# Patient Record
Sex: MALE | Race: OTHER | URBAN - METROPOLITAN AREA
[De-identification: names, ages, dates, MRNs, and addresses within clinical notes are randomized per-mention and may not be internally consistent; named-entity substitution may affect disease eponyms.]

---

## 2024-02-28 ENCOUNTER — OFFICE VISIT (OUTPATIENT)
Dept: FAMILY MEDICINE | Facility: HOSPITAL | Age: 44
End: 2024-02-28
Payer: COMMERCIAL

## 2024-02-28 DIAGNOSIS — I10 HYPERTENSION, UNSPECIFIED TYPE: Primary | ICD-10-CM

## 2024-02-28 DIAGNOSIS — K21.9 GASTROESOPHAGEAL REFLUX DISEASE, UNSPECIFIED WHETHER ESOPHAGITIS PRESENT: ICD-10-CM

## 2024-02-28 DIAGNOSIS — Z11.4 SCREENING FOR HIV (HUMAN IMMUNODEFICIENCY VIRUS): ICD-10-CM

## 2024-02-28 DIAGNOSIS — Z11.59 ENCOUNTER FOR HEPATITIS C SCREENING TEST FOR LOW RISK PATIENT: ICD-10-CM

## 2024-02-28 DIAGNOSIS — R10.13 DYSPEPSIA: ICD-10-CM

## 2024-02-28 DIAGNOSIS — R10.9 ABDOMINAL CRAMPING: ICD-10-CM

## 2024-02-28 DIAGNOSIS — E66.9 CLASS 1 OBESITY WITHOUT SERIOUS COMORBIDITY WITH BODY MASS INDEX (BMI) OF 31.0 TO 31.9 IN ADULT, UNSPECIFIED OBESITY TYPE: ICD-10-CM

## 2024-02-28 PROCEDURE — 99203 OFFICE O/P NEW LOW 30 MIN: CPT | Performed by: STUDENT IN AN ORGANIZED HEALTH CARE EDUCATION/TRAINING PROGRAM

## 2024-02-28 RX ORDER — FAMOTIDINE 20 MG/1
20 TABLET, FILM COATED ORAL 2 TIMES DAILY PRN
Qty: 60 TABLET | Refills: 11 | Status: SHIPPED | OUTPATIENT
Start: 2024-02-28 | End: 2024-03-11 | Stop reason: SDUPTHER

## 2024-02-28 RX ORDER — DICYCLOMINE HYDROCHLORIDE 10 MG/1
10 CAPSULE ORAL
Qty: 120 CAPSULE | Refills: 0 | Status: SHIPPED | OUTPATIENT
Start: 2024-02-28 | End: 2024-03-11 | Stop reason: SDUPTHER

## 2024-02-28 RX ORDER — AMLODIPINE BESYLATE 10 MG/1
10 TABLET ORAL DAILY
Qty: 30 TABLET | Refills: 11 | Status: SHIPPED | OUTPATIENT
Start: 2024-02-28 | End: 2024-03-11 | Stop reason: SDUPTHER

## 2024-02-28 NOTE — PROGRESS NOTES
"  History & Physical  [facility]      SUBJECTIVE:     Farzaneh Bautista is a 43 y.o. year old male with no significant PMHx who presents to to establish care:    CC: Establish, HTN, GERD    HTN:  - /94 with repeat 128/100.  - Ran out of his prescribed amlodipine 4 days ago.   - When patient checks his blood pressure every day and reports this systolics are usually within 140s 150s range.  - Of note, patient presented to ED about 4 months ago complaining of chest pain, found to be hypertensive in the systolics of 170s, but otherwise unremarkable workup.  - That was when he started amlodipine.   - He reports inconsistent/p.r.n. use of amlodipine.  - He has not had any further episodes of chest pain.  - Denies smoking, denies drinking.    GERD/Dyspepsia:   - Patient reports a 2 month history of GERD/dyspepsia associated with burping in lower abdominal cramping that is worse with meals.  - Does not take any medications for management.        There are no problems to display for this patient.       (Not in a hospital admission)    Review of patient's allergies indicates:  No Known Allergies     No past medical history on file.   No past surgical history on file.   No family history on file.   Social History     Tobacco Use    Smoking status: Never    Smokeless tobacco: Never   Substance Use Topics    Alcohol use: Not on file      Review of Systems   Constitutional:        As per HPI, otherwise negative        OBJECTIVE:     Vitals:    02/28/24 1333 02/28/24 1400   BP: (!) 143/94 (!) 128/100   Pulse: 90    Weight: 87.9 kg (193 lb 12.6 oz)    Height: 5' 6" (1.676 m)      Estimated body mass index is 31.28 kg/m² as calculated from the following:    Height as of this encounter: 5' 6" (1.676 m).    Weight as of this encounter: 87.9 kg (193 lb 12.6 oz).     Physical Exam  Constitutional:       Appearance: Normal appearance. He is normal weight.   HENT:      Head: Normocephalic and atraumatic.      " Mouth/Throat:      Mouth: Mucous membranes are moist.      Pharynx: Oropharynx is clear.   Eyes:      Extraocular Movements: Extraocular movements intact.      Conjunctiva/sclera: Conjunctivae normal.      Pupils: Pupils are equal, round, and reactive to light.   Cardiovascular:      Rate and Rhythm: Normal rate and regular rhythm.      Pulses: Normal pulses.      Heart sounds: Normal heart sounds.   Pulmonary:      Effort: Pulmonary effort is normal.      Breath sounds: Normal breath sounds.   Abdominal:      General: Abdomen is flat. Bowel sounds are normal.      Palpations: Abdomen is soft.      Tenderness: There is no abdominal tenderness.   Musculoskeletal:      Cervical back: Normal range of motion.      Right lower leg: No edema.      Left lower leg: No edema.   Skin:     General: Skin is warm and dry.      Capillary Refill: Capillary refill takes less than 2 seconds.   Neurological:      Mental Status: He is alert and oriented to person, place, and time. Mental status is at baseline.   Psychiatric:         Mood and Affect: Mood normal.         Labs:    Lab Results   Component Value Date    WBC 5.70 02/28/2024    HGB 15.1 02/28/2024    HCT 41.7 02/28/2024    MCV 84 02/28/2024     02/28/2024           CMP  Sodium   Date Value Ref Range Status   02/28/2024 141 136 - 145 mmol/L Final     Potassium   Date Value Ref Range Status   02/28/2024 3.8 3.5 - 5.1 mmol/L Final     Chloride   Date Value Ref Range Status   02/28/2024 104 95 - 110 mmol/L Final     CO2   Date Value Ref Range Status   02/28/2024 24 23 - 29 mmol/L Final     Glucose   Date Value Ref Range Status   02/28/2024 89 70 - 110 mg/dL Final     BUN   Date Value Ref Range Status   02/28/2024 11 6 - 20 mg/dL Final     Creatinine   Date Value Ref Range Status   02/28/2024 1.2 0.5 - 1.4 mg/dL Final     Calcium   Date Value Ref Range Status   02/28/2024 10.1 8.7 - 10.5 mg/dL Final     Total Protein   Date Value Ref Range Status   02/28/2024 8.0 6.0 -  "8.4 g/dL Final     Albumin   Date Value Ref Range Status   02/28/2024 4.5 3.5 - 5.2 g/dL Final     Total Bilirubin   Date Value Ref Range Status   02/28/2024 0.5 0.1 - 1.0 mg/dL Final     Comment:     For infants and newborns, interpretation of results should be based  on gestational age, weight and in agreement with clinical  observations.    Premature Infant recommended reference ranges:  Up to 24 hours.............<8.0 mg/dL  Up to 48 hours............<12.0 mg/dL  3-5 days..................<15.0 mg/dL  6-29 days.................<15.0 mg/dL       Alkaline Phosphatase   Date Value Ref Range Status   02/28/2024 66 55 - 135 U/L Final     AST   Date Value Ref Range Status   02/28/2024 42 (H) 10 - 40 U/L Final     ALT   Date Value Ref Range Status   02/28/2024 86 (H) 10 - 44 U/L Final     Anion Gap   Date Value Ref Range Status   02/28/2024 13 8 - 16 mmol/L Final     eGFR   Date Value Ref Range Status   02/28/2024 >60 >60 mL/min/1.73 m^2 Final       No results found for: "TSH"    Lab Results   Component Value Date    HGBA1C 5.2 02/28/2024       The ASCVD Risk score (Mumtaz DK, et al., 2019) failed to calculate for the following reasons:    Unable to determine if patient is Non-     Estimated Creatinine Clearance: 83.3 mL/min (based on SCr of 1.2 mg/dL).      ASSESSMENT/PLAN:       Hypertension, unspecified type:  - Encounter orders: amLODIPine (NORVASC) 10 MG tablet; Take 1 tablet (10 mg total) by mouth once daily.  Dispense: 30 tablet; Refill: 11  - Assessment: The patient's blood pressure readings indicate uncontrolled hypertension. This is supported by his self-reported typical systolic readings in the 140s-150s range, as well as a recent ED visit with systolic readings in the 170s. His inconsistent use of amlodipine likely contributes to the uncontrolled state of his hypertension. No further episodes of CP.  - Plan:     - Reinforce the importance of daily consistent use of amlodipine to " control blood pressure.     - Schedule a follow-up visit to monitor blood pressure levels after the patient has been adherent with daily amlodipine use.    Class 1 obesity without serious comorbidity with body mass index (BMI) of 31.0 to 31.9 in adult, unspecified obesity type:  - Encounter orders: Hemoglobin A1C; Future; Expected date: 02/28/2024; CBC Auto Differential; Future; Expected date: 02/28/2024; Comprehensive Metabolic Panel; Future; Expected date: 02/28/2024; Lipid Panel; Future; Expected date: 02/28/2024  - Assessment: The patient's BMI falls within Class 1 obesity range. This may contribute to his hypertension and potentially other comorbid conditions.  - Plan:     - Advise the patient on the importance of a balanced diet and regular physical activity to reduce weight.     - Schedule regular follow-ups to monitor progress and adjust the plan as necessary.     - Plan to administer Glycemic index questionnaire on next encounter.    Gastroesophageal reflux disease, unspecified whether esophagitis present:  - Encounter orders: famotidine (PEPCID) 20 MG tablet; Take 1 tablet (20 mg total) by mouth 2 (two) times daily as needed for Heartburn.  Dispense: 60 tablet; Refill: 11  - Assessment: The patient's reported symptoms of GERD/dyspepsia associated with lower abdominal cramping that worsens with meals suggest a diagnosis of GERD.   - Plan:     - Start the patient on famotidine as needed for heartburn (advised to hold until he submits stool sample).     - Provide dietary advice on avoiding foods and drinks that can trigger GERD symptoms.     Abdominal cramping:  - Encounter orders: dicyclomine (BENTYL) 10 MG capsule; Take 1 capsule (10 mg total) by mouth 4 (four) times daily before meals and nightly.  Dispense: 120 capsule; Refill: 0  - Assessment: The patient's reported abdominal cramping associated with meals is likely related to his GERD and dyspepsia. Dicyclomine has been prescribed to help with these  symptoms.  - Plan:     - Instruct the patient to take dicyclomine before meals and at bedtime.     - Monitor the patient's response to the medication and adjust dosage as necessary.     Dyspepsia:  - Encounter orders: H. pylori antigen, stool; Future; Expected date: 02/28/2024  - Assessment: The patient's symptoms of GERD and abdominal cramping are consistent with dyspepsia. An H. pylori stool antigen test has been ordered to investigate a possible cause.  - Plan:     - Advise the patient to submit stool sample for H. pylori testing.     - Based on the results of this test, appropriate treatment will be initiated.     Screening for HIV (human immunodeficiency virus):   - Encounter orders: HIV 1/2 Ag/Ab (4th Gen); Future; Expected date: 02/28/2024  - Assessment: As part of a routine health examination, an HIV screening test has been ordered.  - Plan:     - F/u results    Encounter for hepatitis C screening test for low risk patient:  - Encounter orders: Hepatitis C Antibody; Future; Expected date: 02/28/2024  - Assessment: As part of a routine health examination, a Hepatitis C screening test has been ordered.  - Plan:     - Educate the patient about Hepatitis C transmission and prevention, F/u results       Patient instructed to receive or provide proof of all deficient vaccines in chart  - patient verbalized understanding      In regards to A&P, patient verbalized understanding and agreeable to plan      Follow up: 1 month BP check, lab results      Case discussed with staff: Dr. Pan      This note was partially created using iCreate Voice Recognition software. Typographical and content errors may occur with this process. While efforts are made to detect and correct such errors, in some cases errors will persist. For this reason, wording in this document should be considered in the proper context and not strictly verbatim.    The following information is provided to all patients.  This information is to help you  find resources for any of the problems found today that may be affecting your health:                Living healthy guide: www.[facility].[place].[facility]       Understanding Diabetes: www.diabetes.org       Eating healthy: www.cdc.gov/healthyweight      CDC home safety checklist: www.cdc.gov/steadi/patient.html      Agency on Aging: www.goea.[place].[facility]       Alcoholics anonymous (AA): www.aa.org      Physical Activity: www.rafael.nih.gov/rx3eoao       Tobacco use: www.quitwithusla.org       Demarco Haney MD  [facility], PGY-3  02/29/2024

## 2024-02-29 VITALS
HEART RATE: 90 BPM | BODY MASS INDEX: 31.15 KG/M2 | DIASTOLIC BLOOD PRESSURE: 100 MMHG | HEIGHT: 66 IN | WEIGHT: 193.81 LBS | SYSTOLIC BLOOD PRESSURE: 128 MMHG

## 2024-02-29 PROBLEM — K21.9 GASTROESOPHAGEAL REFLUX DISEASE: Status: ACTIVE | Noted: 2024-02-29

## 2024-02-29 PROBLEM — I10 HYPERTENSION: Status: ACTIVE | Noted: 2024-02-29

## 2024-02-29 PROBLEM — E66.9 CLASS 1 OBESITY WITHOUT SERIOUS COMORBIDITY WITH BODY MASS INDEX (BMI) OF 31.0 TO 31.9 IN ADULT: Status: ACTIVE | Noted: 2024-02-29

## 2024-02-29 PROBLEM — R10.13 DYSPEPSIA: Status: ACTIVE | Noted: 2024-02-29

## 2024-02-29 PROBLEM — E66.811 CLASS 1 OBESITY WITHOUT SERIOUS COMORBIDITY WITH BODY MASS INDEX (BMI) OF 31.0 TO 31.9 IN ADULT: Status: ACTIVE | Noted: 2024-02-29

## 2024-03-07 ENCOUNTER — LAB VISIT (OUTPATIENT)
Dept: LAB | Facility: HOSPITAL | Age: 44
End: 2024-03-07
Payer: COMMERCIAL

## 2024-03-07 DIAGNOSIS — R10.13 DYSPEPSIA: ICD-10-CM

## 2024-03-07 PROCEDURE — 87338 HPYLORI STOOL AG IA: CPT | Performed by: STUDENT IN AN ORGANIZED HEALTH CARE EDUCATION/TRAINING PROGRAM

## 2024-03-11 DIAGNOSIS — I10 HYPERTENSION, UNSPECIFIED TYPE: ICD-10-CM

## 2024-03-11 DIAGNOSIS — K21.9 GASTROESOPHAGEAL REFLUX DISEASE, UNSPECIFIED WHETHER ESOPHAGITIS PRESENT: ICD-10-CM

## 2024-03-11 DIAGNOSIS — R10.9 ABDOMINAL CRAMPING: ICD-10-CM

## 2024-03-11 RX ORDER — FAMOTIDINE 20 MG/1
20 TABLET, FILM COATED ORAL 2 TIMES DAILY PRN
Qty: 60 TABLET | Refills: 11 | Status: SHIPPED | OUTPATIENT
Start: 2024-03-11 | End: 2024-03-15 | Stop reason: ALTCHOICE

## 2024-03-11 RX ORDER — AMLODIPINE BESYLATE 10 MG/1
10 TABLET ORAL DAILY
Qty: 30 TABLET | Refills: 11 | Status: SHIPPED | OUTPATIENT
Start: 2024-03-11 | End: 2024-03-15 | Stop reason: SDUPTHER

## 2024-03-11 RX ORDER — DICYCLOMINE HYDROCHLORIDE 10 MG/1
10 CAPSULE ORAL
Qty: 120 CAPSULE | Refills: 0 | Status: SHIPPED | OUTPATIENT
Start: 2024-03-11 | End: 2024-03-15 | Stop reason: SDUPTHER

## 2024-03-12 NOTE — PROGRESS NOTES
I assume primary medical responsibility for this patient. I have reviewed the history, physical, and assessement & treatment plan with the resident and agree that the care is reasonable and necessary. This service has been performed by a resident with the presence of a teaching physician under the primary care exception. If necessary, an addendum of additional findings or evaluation beyond the resident documentation will be noted below.    Chronic disease management provided.      Margarita Pan MD    [facility]

## 2024-03-15 ENCOUNTER — TELEPHONE (OUTPATIENT)
Dept: FAMILY MEDICINE | Facility: HOSPITAL | Age: 44
End: 2024-03-15
Payer: COMMERCIAL

## 2024-03-15 DIAGNOSIS — I10 HYPERTENSION, UNSPECIFIED TYPE: ICD-10-CM

## 2024-03-15 DIAGNOSIS — R10.9 ABDOMINAL CRAMPING: ICD-10-CM

## 2024-03-15 DIAGNOSIS — A04.8 H. PYLORI INFECTION: Primary | ICD-10-CM

## 2024-03-15 LAB — H PYLORI AG STL QL IA: DETECTED

## 2024-03-15 RX ORDER — OMEPRAZOLE 20 MG/1
20 CAPSULE, DELAYED RELEASE ORAL 2 TIMES DAILY
Qty: 84 CAPSULE | Refills: 0 | Status: SHIPPED | OUTPATIENT
Start: 2024-03-15 | End: 2024-04-26

## 2024-03-15 RX ORDER — DICYCLOMINE HYDROCHLORIDE 10 MG/1
10 CAPSULE ORAL
Qty: 120 CAPSULE | Refills: 0 | Status: SHIPPED | OUTPATIENT
Start: 2024-03-15 | End: 2024-04-14

## 2024-03-15 RX ORDER — AMLODIPINE BESYLATE 10 MG/1
10 TABLET ORAL DAILY
Qty: 30 TABLET | Refills: 11 | Status: SHIPPED | OUTPATIENT
Start: 2024-03-15 | End: 2025-03-15

## 2024-03-15 RX ORDER — BISMUTH SUBSALICYLATE 525 MG/30ML
15 LIQUID ORAL 4 TIMES DAILY
Qty: 946 ML | Refills: 0 | Status: SHIPPED | OUTPATIENT
Start: 2024-03-15 | End: 2024-03-25

## 2024-03-15 RX ORDER — METRONIDAZOLE 500 MG/1
500 TABLET ORAL 4 TIMES DAILY
Qty: 56 TABLET | Refills: 0 | Status: SHIPPED | OUTPATIENT
Start: 2024-03-15 | End: 2024-03-29

## 2024-03-15 RX ORDER — TETRACYCLINE HYDROCHLORIDE 500 MG/1
500 CAPSULE ORAL 4 TIMES DAILY
Qty: 56 CAPSULE | Refills: 0 | Status: SHIPPED | OUTPATIENT
Start: 2024-03-15 | End: 2024-03-29

## 2024-03-15 NOTE — TELEPHONE ENCOUNTER
Called patient to discuss lab results and medication issues. Patient seen for the first time 3 weeks ago. Patient expressing frustration 2/2 to not being able to fill his prescriptions. It appears that his birth date did not match the pharmacy records. The issues was fixed and now resolved. Will refill patient's medications. However, calling patient to also discuss his pos H pylori test. He continues to endorse similar symptoms. Discussed the treatment and expected disease course. Prescribed quadruple therapy. Advised to take the Tx prescribed. Plan to recheck stool antigen for resolution in 2 weeks time. I answered and addressed rest of patient's questions and concerns.    Demarco Haney MD  [facility], PGY-3  03/15/2024

## 2024-03-18 ENCOUNTER — TELEPHONE (OUTPATIENT)
Dept: FAMILY MEDICINE | Facility: HOSPITAL | Age: 44
End: 2024-03-18
Payer: COMMERCIAL

## 2024-03-18 DIAGNOSIS — L29.9 PRURITUS: Primary | ICD-10-CM

## 2024-03-18 RX ORDER — DIPHENHYDRAMINE HCL 25 MG
25 CAPSULE ORAL EVERY 6 HOURS PRN
Qty: 56 CAPSULE | Refills: 0 | Status: SHIPPED | OUTPATIENT
Start: 2024-03-18 | End: 2024-04-01

## 2024-03-18 NOTE — TELEPHONE ENCOUNTER
Called and discussed patient's itching that started after starting Quadruple therapy for H pylori infection. Reports that he had itching that starting around his  area (including anus) and has also started on the bottom of his feet as well slightly on the top of his feet. Denies any other related symptoms including rash, Respiratory distress, or abdominal issues. This does not sound like a drug reaction but the timing does need to be considered. At this time the benefits>risks of continuing quadruple Tx. Prescribed benadryl to be used prn (take before his meds). I answered and addressed all questions and concerns. Advised regular f/u.     Demarco Haney MD  [facility], PGY-3  03/18/2024

## 2024-03-20 ENCOUNTER — HOSPITAL ENCOUNTER (EMERGENCY)
Facility: HOSPITAL | Age: 44
Discharge: HOME | End: 2024-03-20
Attending: EMERGENCY MEDICINE
Payer: COMMERCIAL

## 2024-03-20 ENCOUNTER — TELEPHONE (OUTPATIENT)
Dept: FAMILY MEDICINE | Facility: HOSPITAL | Age: 44
End: 2024-03-20
Payer: COMMERCIAL

## 2024-03-20 ENCOUNTER — TELEPHONE (OUTPATIENT)
Dept: HEPATOLOGY | Facility: HOSPITAL | Age: 44
End: 2024-03-20
Payer: COMMERCIAL

## 2024-03-20 VITALS
WEIGHT: 190 LBS | BODY MASS INDEX: 30.53 KG/M2 | RESPIRATION RATE: 18 BRPM | OXYGEN SATURATION: 97 % | TEMPERATURE: 99 F | DIASTOLIC BLOOD PRESSURE: 88 MMHG | HEART RATE: 100 BPM | HEIGHT: 66 IN | SYSTOLIC BLOOD PRESSURE: 144 MMHG

## 2024-03-20 DIAGNOSIS — L27.1 FIXED DRUG ERUPTION: Primary | ICD-10-CM

## 2024-03-20 DIAGNOSIS — L13.8: Primary | ICD-10-CM

## 2024-03-20 DIAGNOSIS — T50.905A: Primary | ICD-10-CM

## 2024-03-20 PROBLEM — L13.9 BULLOUS ERUPTION: Status: ACTIVE | Noted: 2024-03-20

## 2024-03-20 LAB
ACANTHOCYTES BLD QL SMEAR: ABNORMAL
ALBUMIN SERPL BCP-MCNC: 4.5 G/DL (ref 3.5–5.2)
ALP SERPL-CCNC: 65 U/L (ref 55–135)
ALT SERPL W/O P-5'-P-CCNC: 54 U/L (ref 10–44)
ANION GAP SERPL CALC-SCNC: 9 MMOL/L (ref 8–16)
ANISOCYTOSIS BLD QL SMEAR: ABNORMAL
ANISOCYTOSIS BLD QL SMEAR: SLIGHT
AST SERPL-CCNC: 37 U/L (ref 10–40)
AUER BODIES BLD QL SMEAR: ABNORMAL
BASO STIPL BLD QL SMEAR: ABNORMAL
BASOPHILS # BLD AUTO: 0.03 K/UL (ref 0–0.2)
BASOPHILS # BLD AUTO: ABNORMAL K/UL (ref 0–0.2)
BASOPHILS NFR BLD: 0.4 % (ref 0–1.9)
BASOPHILS NFR BLD: ABNORMAL % (ref 0–1.9)
BILIRUB SERPL-MCNC: 0.6 MG/DL (ref 0.1–1)
BLASTS NFR BLD MANUAL: ABNORMAL %
BNP SERPL-MCNC: <10 PG/ML (ref 0–99)
BUN SERPL-MCNC: 16 MG/DL (ref 6–20)
BURR CELLS BLD QL SMEAR: ABNORMAL
CABOT RINGS BLD QL SMEAR: ABNORMAL
CALCIUM SERPL-MCNC: 10.5 MG/DL (ref 8.7–10.5)
CHLORIDE SERPL-SCNC: 105 MMOL/L (ref 95–110)
CO2 SERPL-SCNC: 26 MMOL/L (ref 23–29)
CREAT SERPL-MCNC: 1.2 MG/DL (ref 0.5–1.4)
DACRYOCYTES BLD QL SMEAR: ABNORMAL
DIFFERENTIAL METHOD BLD: ABNORMAL
DIFFERENTIAL METHOD BLD: ABNORMAL
DOHLE BOD BLD QL SMEAR: ABNORMAL
EOSINOPHIL # BLD AUTO: 0.4 K/UL (ref 0–0.5)
EOSINOPHIL # BLD AUTO: ABNORMAL K/UL (ref 0–0.5)
EOSINOPHIL NFR BLD: 4.6 % (ref 0–8)
EOSINOPHIL NFR BLD: ABNORMAL % (ref 0–8)
ERYTHROCYTE [DISTWIDTH] IN BLOOD BY AUTOMATED COUNT: 14.3 % (ref 11.5–14.5)
ERYTHROCYTE [DISTWIDTH] IN BLOOD BY AUTOMATED COUNT: ABNORMAL % (ref 11.5–14.5)
EST. GFR  (NO RACE VARIABLE): >60 ML/MIN/1.73 M^2
GIANT PLATELETS BLD QL SMEAR: ABNORMAL
GLUCOSE SERPL-MCNC: 105 MG/DL (ref 70–110)
HCT VFR BLD AUTO: 43.9 % (ref 40–54)
HCT VFR BLD AUTO: ABNORMAL % (ref 40–54)
HEINZ BOD BLD QL SMEAR: ABNORMAL
HGB BLD-MCNC: 15.4 G/DL (ref 14–18)
HGB BLD-MCNC: ABNORMAL G/DL (ref 14–18)
HGB C CRY RBC QL MICRO: ABNORMAL
HOWELL-JOLLY BOD BLD QL SMEAR: ABNORMAL
HYPOCHROMIA BLD QL SMEAR: ABNORMAL
HYPOCHROMIA BLD QL SMEAR: ABNORMAL
IMM GRANULOCYTES # BLD AUTO: 0.07 K/UL (ref 0–0.04)
IMM GRANULOCYTES # BLD AUTO: ABNORMAL K/UL (ref 0–0.04)
IMM GRANULOCYTES NFR BLD AUTO: 0.8 % (ref 0–0.5)
IMM GRANULOCYTES NFR BLD AUTO: ABNORMAL % (ref 0–0.5)
LIPASE SERPL-CCNC: 17 U/L (ref 4–60)
LYMPHOCYTES # BLD AUTO: 0.6 K/UL (ref 1–4.8)
LYMPHOCYTES # BLD AUTO: ABNORMAL K/UL (ref 1–4.8)
LYMPHOCYTES NFR BLD: 7.7 % (ref 18–48)
LYMPHOCYTES NFR BLD: ABNORMAL % (ref 18–48)
MCH RBC QN AUTO: 30.4 PG (ref 27–31)
MCH RBC QN AUTO: ABNORMAL PG (ref 27–31)
MCHC RBC AUTO-ENTMCNC: 35.1 G/DL (ref 32–36)
MCHC RBC AUTO-ENTMCNC: ABNORMAL G/DL (ref 32–36)
MCV RBC AUTO: 87 FL (ref 82–98)
MCV RBC AUTO: ABNORMAL FL (ref 82–98)
MEGAKARYOCYTIC FRAGMENTS: ABNORMAL
METAMYELOCYTES NFR BLD MANUAL: ABNORMAL %
MONOCYTES # BLD AUTO: 0.6 K/UL (ref 0.3–1)
MONOCYTES # BLD AUTO: ABNORMAL K/UL (ref 0.3–1)
MONOCYTES NFR BLD: 6.7 % (ref 4–15)
MONOCYTES NFR BLD: ABNORMAL % (ref 4–15)
MYELOCYTES NFR BLD MANUAL: ABNORMAL %
NEUTROPHILS # BLD AUTO: 6.7 K/UL (ref 1.8–7.7)
NEUTROPHILS # BLD AUTO: ABNORMAL K/UL (ref 1.8–7.7)
NEUTROPHILS NFR BLD: 79.8 % (ref 38–73)
NEUTROPHILS NFR BLD: ABNORMAL % (ref 38–73)
NEUTS BAND NFR BLD MANUAL: ABNORMAL %
NRBC BLD-RTO: 0 /100 WBC
NRBC BLD-RTO: ABNORMAL /100 WBC
OHS QRS DURATION: 86 MS
OHS QTC CALCULATION: 407 MS
OVALOCYTES BLD QL SMEAR: ABNORMAL
OVALOCYTES BLD QL SMEAR: ABNORMAL
PAPPENHEIMER BOD BLD QL SMEAR: ABNORMAL
PLASMODIUM BLD QL SMEAR: ABNORMAL
PLATELET # BLD AUTO: 308 K/UL (ref 150–450)
PLATELET # BLD AUTO: ABNORMAL K/UL (ref 150–450)
PLATELET BLD QL SMEAR: ABNORMAL
PMV BLD AUTO: ABNORMAL FL (ref 9.2–12.9)
PMV BLD AUTO: ABNORMAL FL (ref 9.2–12.9)
POIKILOCYTOSIS BLD QL SMEAR: ABNORMAL
POIKILOCYTOSIS BLD QL SMEAR: SLIGHT
POLYCHROMASIA BLD QL SMEAR: ABNORMAL
POLYCHROMASIA BLD QL SMEAR: ABNORMAL
POTASSIUM SERPL-SCNC: 4.2 MMOL/L (ref 3.5–5.1)
PROMYELOCYTES NFR BLD MANUAL: ABNORMAL %
PROT SERPL-MCNC: 8.4 G/DL (ref 6–8.4)
RBC # BLD AUTO: 5.07 M/UL (ref 4.6–6.2)
RBC # BLD AUTO: ABNORMAL M/UL (ref 4.6–6.2)
RBC AGGLUT BLD QL: ABNORMAL
ROULEAUX BLD QL SMEAR: ABNORMAL
SCHISTOCYTES BLD QL SMEAR: ABNORMAL
SCHISTOCYTES BLD QL SMEAR: ABNORMAL
SICKLE CELLS BLD QL SMEAR: ABNORMAL
SMUDGE CELLS BLD QL SMEAR: ABNORMAL
SODIUM SERPL-SCNC: 140 MMOL/L (ref 136–145)
SPHEROCYTES BLD QL SMEAR: ABNORMAL
SPHEROCYTES BLD QL SMEAR: ABNORMAL
STOMATOCYTES BLD QL SMEAR: ABNORMAL
TARGETS BLD QL SMEAR: ABNORMAL
TOXIC GRANULES BLD QL SMEAR: ABNORMAL
TROPONIN I SERPL DL<=0.01 NG/ML-MCNC: <0.006 NG/ML (ref 0–0.03)
WBC # BLD AUTO: 8.34 K/UL (ref 3.9–12.7)
WBC # BLD AUTO: ABNORMAL K/UL (ref 3.9–12.7)
WBC NRBC COR # BLD: ABNORMAL K/UL (ref 3.9–12.7)
WBC OTHER NFR BLD MANUAL: ABNORMAL %
WBC TOXIC VACUOLES BLD QL SMEAR: ABNORMAL

## 2024-03-20 PROCEDURE — 25000003 PHARM REV CODE 250: Performed by: EMERGENCY MEDICINE

## 2024-03-20 PROCEDURE — 63600175 PHARM REV CODE 636 W HCPCS: Performed by: EMERGENCY MEDICINE

## 2024-03-20 PROCEDURE — 83880 ASSAY OF NATRIURETIC PEPTIDE: CPT | Performed by: EMERGENCY MEDICINE

## 2024-03-20 PROCEDURE — 80053 COMPREHEN METABOLIC PANEL: CPT | Performed by: EMERGENCY MEDICINE

## 2024-03-20 PROCEDURE — 99284 EMERGENCY DEPT VISIT MOD MDM: CPT | Mod: 25

## 2024-03-20 PROCEDURE — 96374 THER/PROPH/DIAG INJ IV PUSH: CPT

## 2024-03-20 PROCEDURE — 83690 ASSAY OF LIPASE: CPT | Performed by: EMERGENCY MEDICINE

## 2024-03-20 PROCEDURE — 84484 ASSAY OF TROPONIN QUANT: CPT | Performed by: EMERGENCY MEDICINE

## 2024-03-20 PROCEDURE — 85025 COMPLETE CBC W/AUTO DIFF WBC: CPT | Performed by: EMERGENCY MEDICINE

## 2024-03-20 RX ORDER — LIDOCAINE HYDROCHLORIDE 20 MG/ML
5 SOLUTION OROPHARYNGEAL
Status: COMPLETED | OUTPATIENT
Start: 2024-03-20 | End: 2024-03-20

## 2024-03-20 RX ORDER — METHYLPREDNISOLONE SOD SUCC 125 MG
125 VIAL (EA) INJECTION
Status: COMPLETED | OUTPATIENT
Start: 2024-03-20 | End: 2024-03-20

## 2024-03-20 RX ORDER — CLOBETASOL PROPIONATE 0.5 MG/G
OINTMENT TOPICAL 2 TIMES DAILY
Qty: 1 EACH | Refills: 2 | Status: SHIPPED | OUTPATIENT
Start: 2024-03-20 | End: 2024-03-25 | Stop reason: SDUPTHER

## 2024-03-20 RX ORDER — OXYCODONE HYDROCHLORIDE 5 MG/1
5 TABLET ORAL EVERY 6 HOURS PRN
Qty: 7 TABLET | Refills: 0 | Status: SHIPPED | OUTPATIENT
Start: 2024-03-20

## 2024-03-20 RX ORDER — OXYCODONE HYDROCHLORIDE 5 MG/1
5 TABLET ORAL
Status: COMPLETED | OUTPATIENT
Start: 2024-03-20 | End: 2024-03-20

## 2024-03-20 RX ORDER — CLOBETASOL PROPIONATE 0.5 MG/G
OINTMENT TOPICAL 2 TIMES DAILY
Qty: 60 G | Refills: 3 | Status: SHIPPED | OUTPATIENT
Start: 2024-03-20 | End: 2024-04-19

## 2024-03-20 RX ORDER — PREDNISONE 20 MG/1
40 TABLET ORAL 2 TIMES DAILY
Qty: 40 TABLET | Refills: 0 | Status: SHIPPED | OUTPATIENT
Start: 2024-03-20 | End: 2024-03-30

## 2024-03-20 RX ADMIN — METHYLPREDNISOLONE SODIUM SUCCINATE 125 MG: 125 INJECTION, POWDER, FOR SOLUTION INTRAMUSCULAR; INTRAVENOUS at 01:03

## 2024-03-20 RX ADMIN — LIDOCAINE HYDROCHLORIDE 5 ML: 20 SOLUTION OROPHARYNGEAL at 12:03

## 2024-03-20 RX ADMIN — OXYCODONE 5 MG: 5 TABLET ORAL at 01:03

## 2024-03-20 NOTE — HPI
Patient is a 43M with a PMHx of HTN,  H. Pylori gastritis who presented to the emergency department for 2 day history of pruritus and pain with associated blistering to the penis and bilateral feet. Patient also experiencing pain with swallowing.     Patient states that on Monday, he began 4 medications for treatment of his H. Pylori (tetracycline, dicyclomine, metronidazole, and omeprazole). Patient states that appx 2 hours after his first dose of these medications, he began to experience pruritus of the penis. Over the next several hours, he states he began to experience swelling of the penis. Patient went to bed and woke up yesterday and was experiencing severe burning sensation of the bilateral feet and pain when swallowing. He called his primary doc who told him to conitnue his medications as prescribed and start taking benadryl. Patient states that yesterday evening, he began to notice blisters on the soles of his feet. Further, patient had been scratching penis vigorously and began to notice skin peeling in that site. He poked 2x blisters on his feet to relieve pressure. When patient woke up this am, patient states he noticed a few more blisters on his feet and increasing pain with swallowing. He was instructed to report to the ED.     Patient denied any ocular itching or pain. Denied fevers. Endorsed generalized fatigue and pain with swallowing. No pain with defecation. Mild tenderness upon urination. Denied any recent infections. Denied history of cold sores. Denied similar rashes in the past. Hypertensive and tachycardic on evaluation. Dermatology consulted by ED over fears of SJS.

## 2024-03-20 NOTE — SUBJECTIVE & OBJECTIVE
History reviewed. No pertinent past medical history.    History reviewed. No pertinent surgical history.  Family History    None       Tobacco Use    Smoking status: Never    Smokeless tobacco: Never   Substance and Sexual Activity    Alcohol use: Not on file    Drug use: Never    Sexual activity: Not on file       Review of patient's allergies indicates:   Allergen Reactions    Dicyclomine      Possible cause of SJS (taken with 3 other medicines)    Metronidazole      Possible cause of SJS (taken with 3 other medicines)    Omeprazole      Possible cause of SJS (taken with 3 other medicines)    Tetracyclines      Possible cause of SJS (taken with 3 other medicines)       Medications:  Continuous Infusions:  Scheduled Meds:  PRN Meds:    Review of Systems   Constitutional:  Positive for fatigue.   HENT:  Positive for sore throat and mouth sores.    Eyes: Negative.    Respiratory: Negative.     Cardiovascular:  Positive for chest pain.   Gastrointestinal: Negative.    Musculoskeletal: Negative.    Skin:  Positive for itching and rash.   Neurological: Negative.    Psychiatric/Behavioral: Negative.     Endocrine: Negative.    Hematologic/Lymphatic: Negative.      Objective:     Vital Signs (Most Recent):  Temp: 98.9 °F (37.2 °C) (03/20/24 1446)  Pulse: 100 (03/20/24 1446)  Resp: 18 (03/20/24 1446)  BP: (!) 144/88 (03/20/24 1446)  SpO2: 97 % (03/20/24 1430) Vital Signs (24h Range):  Temp:  [98 °F (36.7 °C)-98.9 °F (37.2 °C)] 98.9 °F (37.2 °C)  Pulse:  [] 100  Resp:  [18-20] 18  SpO2:  [97 %-99 %] 97 %  BP: (134-192)/() 144/88     Weight: 86.2 kg (190 lb)  Body mass index is 30.67 kg/m².     Physical Exam   Constitutional: He appears well-developed and well-nourished.   Neurological: He is alert and oriented to person, place, and time.   Psychiatric: He has a normal mood and affect.   Skin:   Areas Examined (abnormalities noted in diagram):   Scalp / Hair Palpated and Inspected  Head / Face Inspection  Performed  Neck Inspection Performed  Chest / Axilla Inspection Performed  Abdomen Inspection Performed  Genitals / Buttocks / Groin Inspection Performed  Back Inspection Performed  RUE Inspected  LUE Inspection Performed  RLE Inspected  LLE Inspection Performed  Nails and Digits Inspection Performed  Gland Inspection Performed                  Significant Labs: All pertinent labs within the past 24 hours have been reviewed.    Significant Imaging: I have reviewed all pertinent imaging results/findings within the past 24 hours.

## 2024-03-20 NOTE — ED PROVIDER NOTES
Encounter Date: 3/20/2024       History     Chief Complaint   Patient presents with    Allergic Reaction     Started new medsOmeprezole and Tetracycline and Metronidazole and Dicyclomine started taking at same time. C/o penile itching and feels like having allergic reaction. Skin rash under foot and feels like he can't swallow      HPI  43-year-old male who presents with rash.  He reports that he was started on medications 3 days ago for H pylori.  Looks like omeprazole, she tetracycline, dicyclomine and metronidazole per the triage note.  He reports that about an hour after the 1st dose he noticed some itching in his penis.  After that he had progressive symptoms including itching in his penis, swelling in his penis, burning and itching to his bilateral feet, blisters to the bilateral feet, and a burning sensation in his throat.  He reports that he does feel like his throat is tight and hurts.  He is able to breathe an drink/eat but it hurts.  Mild shortness of breath.  No abdominal pain, nausea or vomiting.  No other rashes.  No history of allergic reactions.  He took about 6 doses of the medications.  Last dose last night, none today.      Review of patient's allergies indicates:   Allergen Reactions    Dicyclomine      Possible cause of SJS (taken with 3 other medicines)    Metronidazole      Possible cause of SJS (taken with 3 other medicines)    Omeprazole      Possible cause of SJS (taken with 3 other medicines)    Tetracyclines      Possible cause of SJS (taken with 3 other medicines)     History reviewed. No pertinent past medical history.  History reviewed. No pertinent surgical history.  History reviewed. No pertinent family history.  Social History     Tobacco Use    Smoking status: Never    Smokeless tobacco: Never   Substance Use Topics    Drug use: Never     Review of Systems    Physical Exam     Initial Vitals [03/20/24 1026]   BP Pulse Resp Temp SpO2   (!) 192/106 (!) 114 20 98 °F (36.7 °C) 99 %       MAP       --         Physical Exam    Nursing note and vitals reviewed.  Constitutional: He appears well-developed and well-nourished. No distress.   HENT:   Head: Normocephalic and atraumatic.   Blistering to the posterior oropharynx.  Otherwise oropharynx patent   Eyes: Conjunctivae and EOM are normal. Pupils are equal, round, and reactive to light.   Neck:   Normal range of motion.  Cardiovascular:  Normal rate, regular rhythm and normal heart sounds.     Exam reveals no gallop and no friction rub.       No murmur heard.  Pulmonary/Chest: Breath sounds normal. No respiratory distress. He has no wheezes. He has no rhonchi. He has no rales.   Abdominal: Abdomen is soft. He exhibits no distension. There is no abdominal tenderness. There is no rebound and no guarding.   Musculoskeletal:         General: Normal range of motion.      Cervical back: Normal range of motion.     Neurological: He is alert and oriented to person, place, and time. He has normal strength.   Skin: Skin is warm and dry.   Edematous penis with blister that has popped and skin sloughing.  There is erythema to bilateral feet, mostly on the plantar surface with large blisters.  No sloughing.  No other rashes.   Psychiatric: He has a normal mood and affect.         ED Course   Procedures  Labs Reviewed   CBC W/ AUTO DIFFERENTIAL - Abnormal; Notable for the following components:       Result Value    nRBC SEE COMMENT (*)     All other components within normal limits    Narrative:     ADD ON EVERETT VALVERDE RN PER ROSA M GARCIA MD ORDER#   [id number] @  03/20/2024  12:39   Add on BNP per MD Laura Garcia  Specimen is clotted, results taken out and SC to Hillary Horn RN   to recollect specimen.   COMPREHENSIVE METABOLIC PANEL - Abnormal; Notable for the following components:    ALT 54 (*)     All other components within normal limits    Narrative:     ADD ON EVERETT VALVERDE RN PER ROSA M GARCIA MD ORDER#   [id number] @  03/20/2024   12:39   Add on BNP per MD Laura Garcia   LIPASE    Narrative:     ADD ON EVERETT VALVERDE RN PER ROSA M GARCIA MD ORDER#   [id number] @  03/20/2024  12:39   Add on BNP per MD Laura Garcia   B-TYPE NATRIURETIC PEPTIDE   TROPONIN I   TROPONIN I    Narrative:     ADD ON EVERETT VALVERDE RN PER ROSA M GARCIA MD ORDER#   [id number] @  03/20/2024  12:39   Add on BNP per MD Laura Garcia   B-TYPE NATRIURETIC PEPTIDE   CBC W/ AUTO DIFFERENTIAL          Imaging Results    None          Medications   LIDOcaine viscous HCl 2% oral solution 5 mL (5 mLs Oral Given 3/20/24 1226)   methylPREDNISolone sodium succinate injection 125 mg (125 mg Intravenous Given 3/20/24 1333)   oxyCODONE immediate release tablet 5 mg (5 mg Oral Given 3/20/24 1331)     Medical Decision Making  43-year-old male who presents with a rash and itching.  Started after he started medications for H pylori.  Here with swelling in skin sloughing from his penis, itching of the penis, burning and itching and blistering of bilateral feet as well as throat burning.  He does have blisters in his posterior oropharynx, a popped blister on his penis, and blisters to the bottom of both feet.  I have a high suspicion this is Galeana-Gilmar.  Probably secondary to tetracycline.  We have already removed the offending source.  He was tachycardic so we will start fluids.  We will check basic labs.  We will need admission but we will consult dermatology to decide if he was stable for admission here versus transfer to a burn center.    Patient reported chest pain to nurse and Dermatology.  EKG without acute process, sinus, rate 100, normal intervals, no ST changes.  We will get troponin, chest x-ray, BNP.  Patient reports the chest pain has been going on for months.  It is actually why he saw the doctor and they diagnosed with H pylori.  Currently not having any chest pain.  It is central, hurts when he breathes.  No diaphoresis.  No hemoptysis.  No leg  swelling.  No history of DVT or PE, long travel, recent surgery.    Discuss with derm.  They think it is more likely a bolus drug eruption/rash.  Not SJS.  Can be discharged home.  They recommend steroid 1 caleb per kg until they follow up with him in clinic on Monday.  Also clobetasol to feet and penis until follow up on Monday.  Otherwise stable for discharge.    Amount and/or Complexity of Data Reviewed  Labs: ordered.  Radiology: ordered.    Risk  Prescription drug management.                                      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

## 2024-03-20 NOTE — CONSULTS
[facility] - Emergency Dept  Dermatology  Consult Note    Patient Name: Farzaneh Bautista  MRN: [id number]  Admission Date: 3/20/2024  Hospital Length of Stay: 0 days  Attending Physician: No att. providers found  Primary Care Provider: Demarco Haney MD     Inpatient consult to Dermatology  Consult performed by: Ravindra Ruano MD  Consult ordered by: Laura Garcia MD  Reason for consult: SJS rule out  Assessment/Recommendations: Patient is a 43M with PMHx of Htn and H pylori gastritis who presents to ED with blistering tender, pruritic eruption of the feet, penis with soft palate erythema and pain> Patient recently started 4x medications for H pylori hours prior to onset of rash that began on penis. No ocular involvement. Anterior oral mucosa uninvolved. Negative [place] sign. Patient does not fit the clinical picture or history for SJS. Patient's presentation and history are most consistent with bullous fixed drug eruption vs EM major, although bullous fixed drug is more likely given classic involvement of feet and penis. Also denied recent URI or hx of cold sores which again favors bullous fixed drug over EM major.     BULLOUS FIXED DRUG ERUPTION     Plan:  - SJS not suspected at this time.  - biopsy unnecessary at this time.    - diagnosis is bullous fixed drug eruption. Culprit drug difficult to identify given 4x meds started at same time.   - from a dermatologic standpoint, does not require admission for this eruption unless deemed necessary by ED.   - rec starting patient on prednisone or prednisolone 1mg/kg/day with follow up in acute care derm clinic on Monday, 3/25/2024, to assess for improvement/resolution.   - rec clobetasol ointment BID to penis and feet.   - consider ENT eval if deemed needed by ED provider.   - patient educated on the nature of this eruption. Patient instructed to return to ED if new lesions form, the current eruption spreads, if he gets lesions on his mouth, if he becomes  febrile, experiences eye pain or trouble seeing, or if he feels increasing generalized fatigue.               Subjective:     Principal Problem:<principal problem not specified>    HPI:  Patient is a 43M with a PMHx of HTN,  H. Pylori gastritis who presented to the emergency department for 2 day history of pruritus and pain with associated blistering to the penis and bilateral feet. Patient also experiencing pain with swallowing.     Patient states that on Monday, he began 4 medications for treatment of his H. Pylori (tetracycline, dicyclomine, metronidazole, and omeprazole). Patient states that appx 2 hours after his first dose of these medications, he began to experience pruritus of the penis. Over the next several hours, he states he began to experience swelling of the penis. Patient went to bed and woke up yesterday and was experiencing severe burning sensation of the bilateral feet and pain when swallowing. He called his primary doc who told him to conitnue his medications as prescribed and start taking benadryl. Patient states that yesterday evening, he began to notice blisters on the soles of his feet. Further, patient had been scratching penis vigorously and began to notice skin peeling in that site. He poked 2x blisters on his feet to relieve pressure. When patient woke up this am, patient states he noticed a few more blisters on his feet and increasing pain with swallowing. He was instructed to report to the ED.     Patient denied any ocular itching or pain. Denied fevers. Endorsed generalized fatigue and pain with swallowing. No pain with defecation. Mild tenderness upon urination. Denied any recent infections. Denied history of cold sores. Denied similar rashes in the past. Hypertensive and tachycardic on evaluation. Dermatology consulted by ED over fears of SJS.     History reviewed. No pertinent past medical history.    History reviewed. No pertinent surgical history.  Family History    None        Tobacco Use    Smoking status: Never    Smokeless tobacco: Never   Substance and Sexual Activity    Alcohol use: Not on file    Drug use: Never    Sexual activity: Not on file       Review of patient's allergies indicates:   Allergen Reactions    Dicyclomine      Possible cause of SJS (taken with 3 other medicines)    Metronidazole      Possible cause of SJS (taken with 3 other medicines)    Omeprazole      Possible cause of SJS (taken with 3 other medicines)    Tetracyclines      Possible cause of SJS (taken with 3 other medicines)       Medications:  Continuous Infusions:  Scheduled Meds:  PRN Meds:    Review of Systems   Constitutional:  Positive for fatigue.   HENT:  Positive for sore throat and mouth sores.    Eyes: Negative.    Respiratory: Negative.     Cardiovascular:  Positive for chest pain.   Gastrointestinal: Negative.    Musculoskeletal: Negative.    Skin:  Positive for itching and rash.   Neurological: Negative.    Psychiatric/Behavioral: Negative.     Endocrine: Negative.    Hematologic/Lymphatic: Negative.      Objective:     Vital Signs (Most Recent):  Temp: 98.9 °F (37.2 °C) (03/20/24 1446)  Pulse: 100 (03/20/24 1446)  Resp: 18 (03/20/24 1446)  BP: (!) 144/88 (03/20/24 1446)  SpO2: 97 % (03/20/24 1430) Vital Signs (24h Range):  Temp:  [98 °F (36.7 °C)-98.9 °F (37.2 °C)] 98.9 °F (37.2 °C)  Pulse:  [] 100  Resp:  [18-20] 18  SpO2:  [97 %-99 %] 97 %  BP: (134-192)/() 144/88     Weight: 86.2 kg (190 lb)  Body mass index is 30.67 kg/m².     Physical Exam   Constitutional: He appears well-developed and well-nourished.   Neurological: He is alert and oriented to person, place, and time.   Psychiatric: He has a normal mood and affect.   Skin:   Areas Examined (abnormalities noted in diagram):   Scalp / Hair Palpated and Inspected  Head / Face Inspection Performed  Neck Inspection Performed  Chest / Axilla Inspection Performed  Abdomen Inspection Performed  Genitals / Buttocks / Groin  Inspection Performed  Back Inspection Performed  RUE Inspected  LUE Inspection Performed  RLE Inspected  LLE Inspection Performed  Nails and Digits Inspection Performed  Gland Inspection Performed                  Significant Labs: All pertinent labs within the past 24 hours have been reviewed.    Significant Imaging: I have reviewed all pertinent imaging results/findings within the past 24 hours.      Assessment/Plan:     No new Assessment & Plan notes have been filed under this hospital service since the last note was generated.  Service: Dermatology    Patient is a 43M with PMHx of Htn and H pylori gastritis who presents to ED with blistering tender, pruritic eruption of the feet, penis with soft palate erythema and pain> Patient recently started 4x medications for H pylori hours prior to onset of rash that began on penis. No ocular involvement. Anterior oral mucosa uninvolved. Negative [place] sign. Patient does not fit the clinical picture or history for SJS. Patient's presentation and history are most consistent with bullous fixed drug eruption vs EM major, although bullous fixed drug is more likely given classic involvement of feet and penis. Also denied recent URI or hx of cold sores which again favors bullous fixed drug over EM major.     BULLOUS FIXED DRUG ERUPTION     Plan:  - SJS not suspected at this time.   - biopsy unnecessary at this time,  - diagnosis is bullous fixed drug eruption. Culprit drug difficult to identify given 4x meds started at same time.   - from a dermatologic standpoint, does not require admission for this eruption unless deemed necessary by ED.   - rec starting patient on prednisone or prednisolone 1mg/kg/day with follow up in acute care derm clinic on Monday, 3/25/2024, to assess for improvement/resolution.   - rec clobetasol ointment BID to penis and feet.   - consider ENT eval if deemed needed by ED provider. Rec viscous lidocaine for throat pain.   - patient educated on the  nature of this eruption. Patient instructed to return to ED if new lesions form, the current eruption spreads, if he gets lesions on his mouth, if he becomes febrile, experiences eye pain or trouble seeing, or if he feels increasing generalized fatigue.       Thank you for your consult. I will sign off. Please contact us if you have any additional questions.    Ravindra Ruano MD  Dermatology  [facility]

## 2024-03-20 NOTE — TELEPHONE ENCOUNTER
Called patient regarding potential medication allergic reaction. It appears that symptoms have worsened and he now has notable rash and/or skin sloughing. He reports that he is actually at the ED getting evaluated currently. He has since discontinued offending medications (being treated for H pylori w/ quadruple therapy). I had prescribed benadryl to be used with his meds initially 2/2 to some pruritus without any skin findings. Unfortunately, he now has an associated rash/ skin sloughing and reported some difficulty with swallowing. I agreed with ED evaluation which he is currently doing. I advised him to discontinue all associated medications at this time. I advised him to make a f/u apt with me at his earliest convenience. I answered and addressed all questions and concerns.    Demarco Haney MD  [facility], PGY-3  03/20/2024

## 2024-03-20 NOTE — DISCHARGE INSTRUCTIONS
STOP TAKING ALL FOUR MEDICATIONS PRESCRIBED TO YOU 3 DAYS AGO FOR H PYLORI. We cannot tell which is the cause of your allergic reaction so you should assume you are allergic to all of them unless told otherwise.    For mild pain:  Ibuprofen 400mg every 6hrs as needed  Tylenol 650mg every 6hrs as needed    For severe pain:  Oxycodone 5mg every 4-6hrs as needed    For just mouth pain:  Magic mouthwash - Swish and spit (don't swallow) 5mL every 4-6hrs as needed    Take:  Prednisone 40mg two times daily until told to taper down by dermatology    Clobetasol cream: apply a thin layer to feet and penis (not mouth or face) two times daily for 2 weeks or until told to stop by dermatology    Call the number listed for dermatology for an appointment Friday or Monday.    Come back to the emergency department if you have any significant worsening of your rash, especially your mouth.

## 2024-03-20 NOTE — ED TRIAGE NOTES
"Farzaneh Bautista, an 43 y.o. male presents to the ED via POV with itchy rash on soles of both feet, penis, and throat x2 days. Reports starting new medications 2 days ago. Endorses mid "burning" CP. Denies SOB, NVD, recent illness, chills, fever.       Chief Complaint   Patient presents with    Allergic Reaction     Started new medsOmeprezole and Tetracycline and Metronidazole and Dicyclomine started taking at same time. C/o penile itching and feels like having allergic reaction. Skin rash under foot and feels like he can't swallow      Review of patient's allergies indicates:  No Known Allergies  No past medical history on file.    "

## 2024-03-20 NOTE — ED NOTES
Nurses Note -- 4 Eyes      3/20/2024   12:16 PM      Skin assessed during: Admit      [] No Altered Skin Integrity Present    []Prevention Measures Documented      [x] Yes- Altered Skin Integrity Present or Discovered   [] LDA Added if Not in Epic (Describe Wound)   [x] New Altered Skin Integrity was Present on Admit and Documented in LDA   [x] Wound Image Taken    Wound Care Consulted? Yes    Attending Nurse:  Hillary Dodge RN/Staff Member:   Nissa

## 2024-03-25 ENCOUNTER — OFFICE VISIT (OUTPATIENT)
Dept: DERMATOLOGY | Facility: CLINIC | Age: 44
End: 2024-03-25
Payer: COMMERCIAL

## 2024-03-25 DIAGNOSIS — L13.8: ICD-10-CM

## 2024-03-25 DIAGNOSIS — T50.905A: ICD-10-CM

## 2024-03-25 DIAGNOSIS — L27.1 FIXED DRUG ERUPTION: Primary | ICD-10-CM

## 2024-03-25 PROCEDURE — 99203 OFFICE O/P NEW LOW 30 MIN: CPT | Mod: S$GLB,,, | Performed by: DERMATOLOGY

## 2024-03-25 PROCEDURE — 99999 PR PBB SHADOW E&M-EST. PATIENT-LVL II: CPT | Mod: PBBFAC,,, | Performed by: DERMATOLOGY

## 2024-03-25 RX ORDER — CLOBETASOL PROPIONATE 0.5 MG/G
OINTMENT TOPICAL 2 TIMES DAILY
Qty: 60 G | Refills: 2 | Status: SHIPPED | OUTPATIENT
Start: 2024-03-25 | End: 2024-04-24

## 2024-03-25 RX ORDER — PREDNISONE 10 MG/1
TABLET ORAL
Qty: 84 TABLET | Refills: 0 | Status: SHIPPED | OUTPATIENT
Start: 2024-03-29 | End: 2024-04-19

## 2024-03-25 NOTE — PROGRESS NOTES
Subjective:      Patient ID:  Farzaneh Bautista is a 43 y.o. male who presents for No chief complaint on file.    Patient is a 43M with PMHx of Htn and H pylori gastritis who presented to ED with blistering tender, pruritic eruption of the feet, penis with soft palate erythema and pain. Patient recently started 4x medications for H pylori hours prior to onset of rash that began on penis. No ocular involvement. Anterior oral mucosa uninvolved. Negative [place] sign. At the time, bullous fixed drug was suspected and patient was started on prednisone 40 BID and topical clobetasol. Was also given lidocaine swish and spit.     Patient states that since since in ED by us, he has minimal improvement. Patient denies any new blisters or areas of involvement, however, continues to experience pain in his mouth, around his penis, and on his feet (L > R). Patient states the blisters have ruptured and he's now left with very red, tender skin from underneath. Reports pain when manipulating the penis when using the bathroom. Pain when swallowing although that has improved. Also reports pain when walking, prompting him to use crutches for this appt.       Review of Systems   Constitutional: Negative.    HENT:  Positive for sore throat and mouth sores.    Eyes: Negative.    Respiratory: Negative.     Cardiovascular: Negative.    Gastrointestinal: Negative.    Genitourinary:  Positive for genital sores.   Skin:  Positive for rash.   Psychiatric/Behavioral: Negative.         Objective:   Physical Exam   Constitutional: He appears well-developed and well-nourished.   Neurological: He is alert and oriented to person, place, and time.   Psychiatric: He has a normal mood and affect.   Skin:                    Diagram Legend     Erythematous scaling macule/papule c/w actinic keratosis       Vascular papule c/w angioma      Pigmented verrucoid papule/plaque c/w seborrheic keratosis      Yellow umbilicated papule c/w sebaceous  hyperplasia      Irregularly shaped tan macule c/w lentigo     1-2 mm smooth white papules consistent with [place]      Movable subcutaneous cyst with punctum c/w epidermal inclusion cyst      Subcutaneous movable cyst c/w pilar cyst      Firm pink to brown papule c/w dermatofibroma      Pedunculated fleshy papule(s) c/w skin tag(s)      Evenly pigmented macule c/w junctional nevus     Mildly variegated pigmented, slightly irregular-bordered macule c/w mildly atypical nevus      Flesh colored to evenly pigmented papule c/w intradermal nevus       Pink pearly papule/plaque c/w basal cell carcinoma      Erythematous hyperkeratotic cursted plaque c/w SCC      Surgical scar with no sign of skin cancer recurrence      Open and closed comedones      Inflammatory papules and pustules      Verrucoid papule consistent consistent with wart     Erythematous eczematous patches and plaques     Dystrophic onycholytic nail with subungual debris c/w onychomycosis     Umbilicated papule    Erythematous-base heme-crusted tan verrucoid plaque consistent with inflamed seborrheic keratosis     Erythematous Silvery Scaling Plaque c/w Psoriasis     See annotation      Assessment / Plan:        Fixed drug eruption  BULLOUS FIXED DRUG ERUPTION      Plan:  - pt is a hospital f/u for bullous fixed drug eruption involving posterior oropharynx, penis, and bilateral feet.   - diagnosis is bullous fixed drug eruption. Culprit drug difficult to identify given 4x meds started at same time.   - improving and reepithelializing throughout.   - pt has been on prednisone 40mg BID since Thursday, today is day 4. Will do a 28 day taper. Will continue prednisone 40 BID with last dose Thursday evening. Friday morning, patient to start 30mg BID for 7 days, followed by 20 mg BID for 7 days, followed by 10mg BID for 7 days.   - cont. clobetasol ointment BID to feet.  - start plain vaseline or aquaphor BID to penis and feet.   - refilled viscous lidocaine for  throat pain.   - patient educated on the nature of this eruption. Patient instructed to return to ED if new lesions form, the current eruption spreads, if he gets lesions on his mouth, if he becomes febrile, experiences eye pain or trouble seeing, or if he feels increasing generalized fatigue.   - RTC 4 weeks            No follow-ups on file.

## 2024-04-01 ENCOUNTER — OFFICE VISIT (OUTPATIENT)
Dept: FAMILY MEDICINE | Facility: HOSPITAL | Age: 44
End: 2024-04-01
Payer: COMMERCIAL

## 2024-04-01 DIAGNOSIS — L13.9 BULLOUS ERUPTION: ICD-10-CM

## 2024-04-01 DIAGNOSIS — I10 PRIMARY HYPERTENSION: ICD-10-CM

## 2024-04-01 DIAGNOSIS — E66.9 CLASS 1 OBESITY WITHOUT SERIOUS COMORBIDITY WITH BODY MASS INDEX (BMI) OF 30.0 TO 30.9 IN ADULT, UNSPECIFIED OBESITY TYPE: ICD-10-CM

## 2024-04-01 DIAGNOSIS — A04.8 H. PYLORI INFECTION: Primary | ICD-10-CM

## 2024-04-01 DIAGNOSIS — L27.1 FIXED DRUG ERUPTION: ICD-10-CM

## 2024-04-01 DIAGNOSIS — E78.2 MIXED HYPERLIPIDEMIA: ICD-10-CM

## 2024-04-01 PROCEDURE — 99214 OFFICE O/P EST MOD 30 MIN: CPT | Performed by: STUDENT IN AN ORGANIZED HEALTH CARE EDUCATION/TRAINING PROGRAM

## 2024-04-01 RX ORDER — BISMUTH SUBSALICYLATE 525 MG/30ML
15 LIQUID ORAL EVERY 4 HOURS
Qty: 946 ML | Refills: 0 | Status: SHIPPED | OUTPATIENT
Start: 2024-04-01 | End: 2024-04-15

## 2024-04-02 ENCOUNTER — TELEPHONE (OUTPATIENT)
Dept: HEPATOLOGY | Facility: HOSPITAL | Age: 44
End: 2024-04-02
Payer: COMMERCIAL

## 2024-04-02 VITALS
OXYGEN SATURATION: 98 % | WEIGHT: 186.31 LBS | DIASTOLIC BLOOD PRESSURE: 91 MMHG | SYSTOLIC BLOOD PRESSURE: 134 MMHG | BODY MASS INDEX: 29.94 KG/M2 | HEIGHT: 66 IN

## 2024-04-02 DIAGNOSIS — A04.8 H. PYLORI INFECTION: Primary | ICD-10-CM

## 2024-04-02 RX ORDER — AMOXICILLIN 500 MG/1
1000 TABLET, FILM COATED ORAL EVERY 12 HOURS
Qty: 56 TABLET | Refills: 0 | Status: SHIPPED | OUTPATIENT
Start: 2024-04-02 | End: 2024-04-16

## 2024-04-02 NOTE — PROGRESS NOTES
I assume primary medical responsibility for this patient. I have reviewed the history, physical, and assessment & treatment plan with the resident and agree that the care is reasonable and necessary. This service has been performed by a resident without the presence of a teaching physician under the primary care exception. If necessary, an addendum of additional findings or evaluation beyond the resident documentation will be noted below.        James Andrew Jr., DO    [facility]

## 2024-04-02 NOTE — PROGRESS NOTES
Progress Note  [facility]    Subjective:     Farzaneh Bautisat is a 43 y.o. year old male with PMHx of GERD who presents to clinic for:    CC: H pylori, Drug reaction    H. Pylori/Drug reaction:  - Patient started on standard quadruple therapy 2-3 weeks ago.  - He was unable to complete the course (only completed 1 1/2 days).  - This was 2/2 to a drug reaction from one of the 4 meds that was started.  - It started with pruritis of his groin and the bottom of his feet that did not improve with benadryl, but instead worsed into a bullous fixed drug eruption.  - S/p ED evaluation and established with derm for bullous fixed drug eruption that ended up involved posterior oropharynx, penis, and bilateral feet.   - Started on a prednisone 40mg BID w/ 28 day taper, as well as indicated creams and ointments to affected areas.   - Regarding H pylori, patient reports continued GERD/dyspepsia associated with burping in lower abdominal cramping that is worse with meals.  - At this time is not on any medications due to fear of allergic reaction.  - Of note, allergic symptoms started within an hour of starting medications and became more severe as the time progressed and continued to take the meds.    HTN:  - /97 w/ repeat at 134/91.  - Has not been taking prescribed amlodipine 2/2 to potential allergic reaction.  - Patient has classically been on this medications with no issues.      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    Bullous eruption 03/20/2024    Hypertension 02/29/2024    Class 1 obesity without serious comorbidity with body mass index (BMI) of 31.0 to 31.9 in adult 02/29/2024    Gastroesophageal reflux disease 02/29/2024    Dyspepsia 02/29/2024        (Not in a hospital admission)    Review of patient's allergies indicates:   Allergen Reactions    Dicyclomine      Possible cause of SJS (taken with 3 other medicines)    Metronidazole      Possible cause  "of SJS (taken with 3 other medicines)    Omeprazole      Possible cause of SJS (taken with 3 other medicines)    Tetracyclines      Possible cause of SJS (taken with 3 other medicines)        No past medical history on file.   No past surgical history on file.   No family history on file.   Social History     Tobacco Use    Smoking status: Never    Smokeless tobacco: Never   Substance Use Topics    Alcohol use: Not on file      Review of Systems   Constitutional:        As per HPI, otherwise negative        Objective:     Vitals:    04/01/24 1513 04/01/24 1530   BP: (!) 137/97 (!) 134/91   SpO2: 98%    Weight: 84.5 kg (186 lb 4.6 oz)    Height: 5' 6" (1.676 m)      Estimated body mass index is 30.07 kg/m² as calculated from the following:    Height as of this encounter: 5' 6" (1.676 m).    Weight as of this encounter: 84.5 kg (186 lb 4.6 oz).     Physical Exam  Constitutional:       Appearance: Normal appearance. He is normal weight.   HENT:      Head: Normocephalic and atraumatic.      Mouth/Throat:      Mouth: Mucous membranes are moist.      Pharynx: Oropharynx is clear. Posterior oropharyngeal erythema present.   Eyes:      Extraocular Movements: Extraocular movements intact.      Conjunctiva/sclera: Conjunctivae normal.      Pupils: Pupils are equal, round, and reactive to light.   Cardiovascular:      Rate and Rhythm: Normal rate and regular rhythm.      Pulses: Normal pulses.      Heart sounds: Normal heart sounds.   Pulmonary:      Effort: Pulmonary effort is normal.      Breath sounds: Normal breath sounds.   Abdominal:      General: Abdomen is flat. Bowel sounds are normal.      Palpations: Abdomen is soft.      Tenderness: There is no abdominal tenderness.   Genitourinary:     Comments: Exam deferred  Musculoskeletal:      Cervical back: Normal range of motion.      Right lower leg: No edema.      Left lower leg: No edema.      Comments: Well demarcated patches of erythema to B/L soles of feet.   Skin:    "  General: Skin is warm and dry.      Capillary Refill: Capillary refill takes less than 2 seconds.   Neurological:      Mental Status: He is alert and oriented to person, place, and time. Mental status is at baseline.   Psychiatric:         Mood and Affect: Mood normal.       Assessment/Plan:       H. pylori infection:  - Encounter Orders: bismuth subsalicylate (PEPTO BISMOL) 262 mg/15 mL suspension; Take 15 mLs by mouth every 4 (four) hours. for 14 days  Dispense: 946 mL; Refill: 0  - Assessment: The patient's persistent GERD/dyspepsia symptoms, associated with burping and lower abdominal cramping, particularly after meals, suggest a continued H. pylori infection. This is likely due to the incomplete course of quadruple therapy, which was interrupted due to a severe allergic reaction to one of the agents.   - Plan:    - Ok to continue the use of PPI and Bentyl, as previously tolerated, and add Pepto Bismol to potentially aid in symptom management and as part of the indicated quadruple therapy.    - Plan to transition the patient to an alternative regimen for H. pylori treatment, starting with Augmentin, followed by the addition of Clarithromycin, if tolerated.    - Monitor patient closely during the course of treatment and instruct to discontinue any offending agent at the immediate onset of symptoms.     Fixed drug eruption:  - Assessment: The patient experienced a drug reaction to one of the medications in the quadruple therapy, manifesting as a bullous fixed drug eruption. This reaction involved pruritus, and progressed to involve the posterior oropharynx, penis, and bilateral feet.  - Plan:     - Continue management as recommended by dermatology, including prednisone 40mg BID with a 28 day taper, and application of indicated creams and ointments to affected areas.    - Regular follow-up to monitor healing and prevent potential complications.    Bullous eruption:  - Assessment: The bullous eruption, which is  part of the fixed drug eruption, has shown improvement under current management, indicating effective treatment.  - Plan:     - Continue the current management plan, including the use of prednisone and topical treatments.    - Monitor the patient's skin condition closely to ensure continued improvement and prevent complications.    Class 1 obesity without serious comorbidity with body mass index (BMI) of 30.0 to 30.9 in adult, unspecified obesity type:  - Assessment: The patient's BMI falls within the range for class 1 obesity. Weight loss since previous encounter.  - Plan:     - Encourage a healthy diet and regular exercise to help manage weight.    - Regular follow-ups to monitor weight and related health parameters.    Primary hypertension:  - Assessment: The patient's blood pressure readings were elevated, indicating mildly uncontrolled hypertension. Non-compliance with antihypertensive medication due to fear of allergic reaction may have contributed to this.  - Plan:     - Advise the patient to resume taking prescribed amlodipine, reassuring him that he has previously tolerated this medication.    - Regular monitoring of blood pressure levels and adjustment of medication as necessary.    - Encourage lifestyle modifications including a healthy diet, regular exercise, and stress management.     Mixed Hyperlipidemia:  - Assessment: The patient's LDL level is greater than 170, indicating mixed hyperlipidemia. Currently, the patient is not on statin therapy.   - Plan:     - At this time, it would not be appropriate to initiate statin therapy given the plan to carefully transition to an alternative H. pylori treatment regimen and the need to closely monitor for tolerance.     - Once the patient has completed the H. pylori treatment regimen and is stable, reconsider the initiation of statin therapy.     - Encourage lifestyle modifications such as a heart-healthy diet, regular exercise, and weight management.     -  Regular lipid profile monitoring to track progress and guide future management.      Patient instructed to receive or provide proof of all deficient vaccines in chart - patient verbalized understanding      In regards to A&P, patient verbalized understanding and agreeable to plan      Follow-up:2 weeks, re-evaluation      Case discussed with staff: Dr. Andrew      This note was partially created using [employer] Voice Recognition software. Typographical and content errors may occur with this process. While efforts are made to detect and correct such errors, in some cases errors will persist. For this reason, wording in this document should be considered in the proper context and not strictly verbatim.    The following information is provided to all patients.  This information is to help you find resources for any of the problems found today that may be affecting your health:                Living healthy guide: www.[facility].[place].gov       Understanding Diabetes: www.diabetes.org       Eating healthy: www.cdc.gov/healthyweight      [facility] home safety checklist: www.cdc.gov/steadi/patient.html      Agency on Aging: www.goea.[place].gov       Alcoholics anonymous (AA): www.aa.org      Physical Activity: www.rafael.nih.gov/hs7bcjw       Tobacco use: www.quitwithusla.org        Demarco Haney MD  [facility], PGY-3  04/02/2024

## 2024-04-03 ENCOUNTER — TELEPHONE (OUTPATIENT)
Dept: FAMILY MEDICINE | Facility: HOSPITAL | Age: 44
End: 2024-04-03
Payer: COMMERCIAL

## 2024-04-03 DIAGNOSIS — A04.8 H. PYLORI INFECTION: Primary | ICD-10-CM

## 2024-04-03 RX ORDER — CLARITHROMYCIN 500 MG/1
500 TABLET, FILM COATED ORAL EVERY 12 HOURS
Qty: 28 TABLET | Refills: 0 | Status: SHIPPED | OUTPATIENT
Start: 2024-04-03 | End: 2024-04-17

## 2024-04-03 NOTE — TELEPHONE ENCOUNTER
Called to see how new alternative quadruple Therapy is going. Patient tolerating meds well. Taking PPI and Bismuth as prescribed. Also taking Bentyl prn for abdominal pain/bloating, tolerating well. Will begin Augmentin today and observe his tolerance. Advised him to discontinue at any signs of possible allergic reaction. Discussed ED precautions. Plan to start Clarithromycin tomorrow and to continue the alternative quadruple Tx for 14 days.    Demarco Haney MD  [facility], PGY-3  04/03/2024

## 2024-04-04 NOTE — TELEPHONE ENCOUNTER
Called Patient to discuss treatment course of alternative Quadruple Tx for H pylori. Given allergic reaction, he was started on alternative regimen. Addition of Amoxacillin yesterday, tolerating well. Added Clarithromycin today. Will monitor. Discussed indications to hold medication. Discussed ED precautions. Patient agreeable to treatment plan.    Demarco Haney MD  [facility], PGY-3  04/04/2024

## 2024-04-09 NOTE — PROGRESS NOTES
I have seen the patient, reviewed the Resident's progress note. I have personally interviewed and examined the patient at bedside and agree with the findings.     Magy Castellon MD  [facility]

## 2024-04-18 ENCOUNTER — OFFICE VISIT (OUTPATIENT)
Dept: FAMILY MEDICINE | Facility: HOSPITAL | Age: 44
End: 2024-04-18
Payer: COMMERCIAL

## 2024-04-18 VITALS
WEIGHT: 190.5 LBS | HEART RATE: 107 BPM | SYSTOLIC BLOOD PRESSURE: 141 MMHG | DIASTOLIC BLOOD PRESSURE: 95 MMHG | HEIGHT: 66 IN | OXYGEN SATURATION: 97 % | BODY MASS INDEX: 30.62 KG/M2

## 2024-04-18 DIAGNOSIS — A04.8 H. PYLORI INFECTION: Primary | ICD-10-CM

## 2024-04-18 DIAGNOSIS — L13.9 BULLOUS ERUPTION: ICD-10-CM

## 2024-04-18 DIAGNOSIS — E66.9 CLASS 1 OBESITY WITHOUT SERIOUS COMORBIDITY WITH BODY MASS INDEX (BMI) OF 30.0 TO 30.9 IN ADULT, UNSPECIFIED OBESITY TYPE: ICD-10-CM

## 2024-04-18 DIAGNOSIS — I10 PRIMARY HYPERTENSION: ICD-10-CM

## 2024-04-18 DIAGNOSIS — E78.2 MIXED HYPERLIPIDEMIA: ICD-10-CM

## 2024-04-18 PROCEDURE — 99214 OFFICE O/P EST MOD 30 MIN: CPT | Performed by: STUDENT IN AN ORGANIZED HEALTH CARE EDUCATION/TRAINING PROGRAM

## 2024-04-18 RX ORDER — ATORVASTATIN CALCIUM 20 MG/1
20 TABLET, FILM COATED ORAL DAILY
Qty: 30 TABLET | Refills: 11 | Status: SHIPPED | OUTPATIENT
Start: 2024-04-18 | End: 2025-04-18

## 2024-04-18 NOTE — PROGRESS NOTES
Progress Note  [facility]    Subjective:     Farzaneh Bautista is a 43 y.o. year old male with significant PMHx of Drug induced Bullous eruption who presents to clinic for:    CC: H pylori, Drug reaction    H. Pylori/Drug reaction:  - Patient has finished alternative treatment course of quadruple Tx.  - He was initially unable to complete the course (only completed 1 1/2 days).  - This was 2/2 to a drug reaction from one of the 2 meds (Tetracycline or Metronidazole), which was part of the initial quadruple treatment protocol.  - Skin symptoms initially started with pruritis of his groin and the bottom of his feet that did not improve with benadryl, but instead worsed into a bullous fixed drug eruption.  - S/p ED evaluation and established with derm for bullous fixed drug eruption that ended up involved posterior oropharynx, penis, and bilateral feet.   - S/p prednisone 40mg BID w/ 28 day taper, also treatment w/ indicated creams and ointments to affected areas.  - Has had regular f/u with Derm.   - Regarding H pylori, symptoms have resolved following treatment of alternative quadruple Tx.  - Of note, allergic symptoms started within an hour of starting medications and became more severe as the time progressed and continued to take the meds.    HTN:  - /112 w/ repeat 141/95  - Has not been taking prescribed amlodipine 2/2 to potential allergic reaction.  - Patient has classically been on this medications with no issues.  - Amenable to begin antihypertensive management now that quadruple Tx is finished.    HLD:  - Noted to have moderately elevated Trigs and LDL on recent lipid panel,  - Amenable to begin statin therapy.      Patient Active Problem List    Diagnosis Date Noted    H. 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   No past surgical history on file.   No family history on file.   Social History     Tobacco Use    Smoking status: Never    Smokeless tobacco: Never   Substance Use Topics    Alcohol use: Not on file      Review of Systems   Constitutional:        As per HPI, otherwise negative        Objective:     Vitals:    04/18/24 0958 04/18/24 1002   BP: (!) 153/112 (!) 141/95   Pulse: 107    SpO2: 97%    Weight: 86.4 kg (190 lb 7.6 oz)    Height: 5' 6" (1.676 m)      Estimated body mass index is 30.74 kg/m² as calculated from the following:    Height as of this encounter: 5' 6" (1.676 m).    Weight as of this encounter: 86.4 kg (190 lb 7.6 oz).     Physical Exam  Constitutional:       Appearance: Normal appearance. He is normal weight.   HENT:      Head: Normocephalic and atraumatic.      Mouth/Throat:      Mouth: Mucous membranes are moist.      Pharynx: Oropharynx is clear.   Eyes:      Extraocular Movements: Extraocular movements intact.      Conjunctiva/sclera: Conjunctivae normal.      Pupils: Pupils are equal, round, and reactive to light.   Cardiovascular:      Rate and Rhythm: Normal rate and regular rhythm.      Pulses: Normal pulses.      Heart sounds: Normal heart sounds.   Pulmonary:      Effort: Pulmonary effort is normal.      Breath sounds: Normal breath sounds.   Abdominal:      General: Abdomen is flat. Bowel sounds are normal.      Palpations: Abdomen is soft.      Tenderness: There is no abdominal tenderness.   Musculoskeletal:      Cervical back: Normal range of motion.      Right lower leg: No edema.      Left lower leg: No " edema.   Skin:     General: Skin is warm and dry.      Capillary Refill: Capillary refill takes less than 2 seconds.   Neurological:      Mental Status: He is alert and oriented to person, place, and time. Mental status is at baseline.   Psychiatric:         Mood and Affect: Mood normal.       Assessment/Plan:     Farzaneh was seen today for follow-up.    Diagnoses and all orders for this visit:    H. pylori infection:  Encounter Orders: H. pylori antigen, stool; Future  Assessment: The patient has successfully completed an alternative course of quadruple treatment for his H. pylori infection. His symptoms of the infection have resolved following this treatment, indicating a positive response to the therapy. However, the patient had a significant drug reaction to one of the medications in the initial treatment protocol, which will need to be taken into account in future treatment decisions.  Plan:  The patient will stop taking his PPI medication.  He will retest for H. pylori in two weeks using a stool sample to confirm the eradication of the bacteria.  Continue monitoring for any recurrent symptoms of H. pylori infection.    Primary hypertension:  Assessment: The patient has a history of hypertension and has been off his prescribed antihypertensive medication due to a potential allergic reaction. His blood pressure readings during this visit were high, indicating the need for antihypertensive management.  Plan:  Advised to restart amlodipine, which the patient has taken previously without issues.  He will return in two weeks to monitor his blood pressure and assess the effectiveness of the medication.    Mixed hyperlipidemia:  Encounter Orders: atorvastatin (LIPITOR) 20 MG tablet; Take 1 tablet (20 mg total) by mouth once daily.  Assessment: The patient has moderately elevated triglycerides and LDL levels as shown in his recent lipid panel. This, along with his hypertension, puts him at an increased risk for  cardiovascular disease.  Plan:  Start the patient on atorvastatin to manage his high cholesterol levels.  Encourage lifestyle modifications including a heart-healthy diet and regular exercise.    Class 1 obesity without serious comorbidity with body mass index (BMI) of 30.0 to 30.9 in adult, unspecified obesity type:  Assessment: The patient's BMI falls within the range for Class 1 obesity. This can further contribute to his hypertension and hyperlipidemia.  Plan:  Advise the patient on the importance of weight management in controlling his hypertension and hyperlipidemia.  Provide education on dietary modifications and the benefits of regular physical activity.  Continue monitoring the patient's weight and BMI in future visits.    Bullous eruption:  Assessment: The patient had a severe drug reaction resulting in a bullous eruption that involved the posterior oropharynx, penis, and bilateral feet. He has been under the care of a dermatologist for this issue and has completed a course of prednisone along with topical treatments.  Plan:  The patient should continue regular follow-ups with Dermatology to monitor his skin condition.  Avoid the suspected medications (Tetracycline or Metronidazole) in the future to prevent similar reactions.  Continue monitoring for any new or worsening skin symptoms.       Patient instructed to receive or provide proof of all deficient vaccines in chart  - patient verbalized understanding      In regards to A&P, patient verbalized understanding and agreeable to plan      Follow-up: 2-4 weeks BP check, stool antigen drop off      Case discussed with staff: Dr. Quinones      This note was partially created using [employer] Voice Recognition software. Typographical and content errors may occur with this process. While efforts are made to detect and correct such errors, in some cases errors will persist. For this reason, wording in this document should be considered in the proper context and  not strictly verbatim.    The following information is provided to all patients.  This information is to help you find resources for any of the problems found today that may be affecting your health:                Living healthy guide: www.[facility].[place].[facility]       Understanding Diabetes: www.diabetes.org       Eating healthy: www.cdc.gov/healthyweight      CDC home safety checklist: www.cdc.gov/steadi/patient.html      Agency on Aging: www.goea.[place].[facility]       Alcoholics anonymous (AA): www.aa.org      Physical Activity: www.rafael.nih.gov/xr7eiuy       Tobacco use: www.quitwithusla.org        Demarco Haney MD  [facility] [place] [facility], PGY-3  04/18/2024

## 2024-04-23 ENCOUNTER — OFFICE VISIT (OUTPATIENT)
Dept: DERMATOLOGY | Facility: CLINIC | Age: 44
End: 2024-04-23
Payer: COMMERCIAL

## 2024-04-23 DIAGNOSIS — L13.9 BULLOUS ERUPTION: Primary | ICD-10-CM

## 2024-04-23 PROCEDURE — 99999 PR PBB SHADOW E&M-EST. PATIENT-LVL II: CPT | Mod: PBBFAC,,, | Performed by: DERMATOLOGY

## 2024-04-23 PROCEDURE — 99213 OFFICE O/P EST LOW 20 MIN: CPT | Mod: S$GLB,,, | Performed by: DERMATOLOGY

## 2024-04-23 RX ORDER — HYDROCORTISONE 25 MG/G
OINTMENT TOPICAL 2 TIMES DAILY
Qty: 40 G | Refills: 0 | Status: SHIPPED | OUTPATIENT
Start: 2024-04-23 | End: 2024-05-23

## 2024-04-23 NOTE — PROGRESS NOTES
Subjective:      Patient ID:  Farzaneh Bautista is a 43 y.o. male who presents for No chief complaint on file.    43M follow up for bullous fixed drug eruption of penis, BL feet, and posterior oropharynx after starting quadruple therapy for H pylori (tetracycline, metronidazole, omeprazole, dicyclomine).     Patient's feet and oropharynx have completely healed. Shaft of penis completely healed. Remains with eroded skin at the glans penis surrounding urethral orifice. Has been applying vaseline twice a day. Feels like it looks better in the morning and worse throughout the day while working outside. Denied other concerns or complaints today.       Review of Systems   Constitutional: Negative.    Respiratory: Negative.     Cardiovascular: Negative.    Skin:  Positive for rash.       Objective:   Physical Exam   Constitutional: He appears well-developed and well-nourished.   Neurological: He is alert and oriented to person, place, and time.   Psychiatric: He has a normal mood and affect.   Skin:   Areas Examined (abnormalities noted in diagram):   Scalp / Hair Palpated and Inspected  Head / Face Inspection Performed  Neck Inspection Performed  Chest / Axilla Inspection Performed  Abdomen Inspection Performed  Genitals / Buttocks / Groin Inspection Performed  RLE Inspected  LLE Inspection Performed            Diagram Legend     Erythematous scaling macule/papule c/w actinic keratosis       Vascular papule c/w angioma      Pigmented verrucoid papule/plaque c/w seborrheic keratosis      Yellow umbilicated papule c/w sebaceous hyperplasia      Irregularly shaped tan macule c/w lentigo     1-2 mm smooth white papules consistent with Milia      Movable subcutaneous cyst with punctum c/w epidermal inclusion cyst      Subcutaneous movable cyst c/w pilar cyst      Firm pink to brown papule c/w dermatofibroma      Pedunculated fleshy papule(s) c/w skin tag(s)      Evenly pigmented macule c/w junctional nevus     Mildly  variegated pigmented, slightly irregular-bordered macule c/w mildly atypical nevus      Flesh colored to evenly pigmented papule c/w intradermal nevus       Pink pearly papule/plaque c/w basal cell carcinoma      Erythematous hyperkeratotic cursted plaque c/w SCC      Surgical scar with no sign of skin cancer recurrence      Open and closed comedones      Inflammatory papules and pustules      Verrucoid papule consistent consistent with wart     Erythematous eczematous patches and plaques     Dystrophic onycholytic nail with subungual debris c/w onychomycosis     Umbilicated papule    Erythematous-base heme-crusted tan verrucoid plaque consistent with inflamed seborrheic keratosis     Erythematous Silvery Scaling Plaque c/w Psoriasis     See annotation      Assessment / Plan:        Bullous eruption  - suspected drugs : tetracycline vs metronidazole vs dicyclomine (most likely tetracycline/metronidazole)  - pt with near resolved bullous fixed drug eruption. Remains with erosion around the orifice of the glans penis. Feet, posterior oropharynx, and shaft of penis resolved.   - will start hydrocortisone 2.5% BID to the affected area followed by vaseline application.   - pt to follow up PRN if not resolving.   -     hydrocortisone 2.5 % ointment; Apply topically 2 (two) times daily.  Dispense: 40 g; Refill: 0             No follow-ups on file.

## 2024-05-09 ENCOUNTER — LAB VISIT (OUTPATIENT)
Dept: LAB | Facility: HOSPITAL | Age: 44
End: 2024-05-09
Attending: STUDENT IN AN ORGANIZED HEALTH CARE EDUCATION/TRAINING PROGRAM
Payer: COMMERCIAL

## 2024-05-09 DIAGNOSIS — A04.8 H. PYLORI INFECTION: ICD-10-CM

## 2024-05-09 PROCEDURE — 87338 HPYLORI STOOL AG IA: CPT | Performed by: STUDENT IN AN ORGANIZED HEALTH CARE EDUCATION/TRAINING PROGRAM

## 2024-05-18 LAB — H PYLORI AG STL QL IA: NOT DETECTED

## 2024-05-23 ENCOUNTER — OFFICE VISIT (OUTPATIENT)
Dept: FAMILY MEDICINE | Facility: HOSPITAL | Age: 44
End: 2024-05-23
Payer: COMMERCIAL

## 2024-05-23 VITALS
RESPIRATION RATE: 18 BRPM | SYSTOLIC BLOOD PRESSURE: 134 MMHG | TEMPERATURE: 98 F | HEIGHT: 66 IN | OXYGEN SATURATION: 99 % | HEART RATE: 95 BPM | BODY MASS INDEX: 31.46 KG/M2 | DIASTOLIC BLOOD PRESSURE: 99 MMHG | WEIGHT: 195.75 LBS

## 2024-05-23 DIAGNOSIS — S69.91XA INJURY OF FINGER OF RIGHT HAND, INITIAL ENCOUNTER: ICD-10-CM

## 2024-05-23 DIAGNOSIS — Z86.19 HISTORY OF HELICOBACTER PYLORI INFECTION: ICD-10-CM

## 2024-05-23 DIAGNOSIS — K21.9 GASTROESOPHAGEAL REFLUX DISEASE, UNSPECIFIED WHETHER ESOPHAGITIS PRESENT: ICD-10-CM

## 2024-05-23 DIAGNOSIS — E78.2 MIXED HYPERLIPIDEMIA: ICD-10-CM

## 2024-05-23 DIAGNOSIS — E66.9 CLASS 1 OBESITY WITHOUT SERIOUS COMORBIDITY WITH BODY MASS INDEX (BMI) OF 31.0 TO 31.9 IN ADULT, UNSPECIFIED OBESITY TYPE: ICD-10-CM

## 2024-05-23 DIAGNOSIS — S39.94XA PENIS INJURY, INITIAL ENCOUNTER: Primary | ICD-10-CM

## 2024-05-23 DIAGNOSIS — I10 PRIMARY HYPERTENSION: ICD-10-CM

## 2024-05-23 DIAGNOSIS — Z87.2: ICD-10-CM

## 2024-05-23 PROCEDURE — 99215 OFFICE O/P EST HI 40 MIN: CPT | Performed by: STUDENT IN AN ORGANIZED HEALTH CARE EDUCATION/TRAINING PROGRAM

## 2024-05-23 RX ORDER — FAMOTIDINE 20 MG/1
20 TABLET, FILM COATED ORAL 2 TIMES DAILY PRN
Qty: 60 TABLET | Refills: 11 | Status: SHIPPED | OUTPATIENT
Start: 2024-05-23 | End: 2025-05-23

## 2024-05-23 NOTE — PROGRESS NOTES
Progress Note  [facility]    Subjective:     Farzaneh Bautista is a 43 y.o. year old male with PMHx of Drug induced Bullous eruption who presents to clinic for:    CC: Post-treatment complications, High Blood Pressure, Finger Injury    Post-treatment complications:  - The patient reported experiencing bleeding during sexual intercourse s/p the treatment of bullous drug reaction that affected his penis, as well as other parts.  - He reports skin conditions have largely resolved.  - However, he stated he has had sexual intercourse three times since the treatment, and two of these instances resulted in bleeding.  - Wanted to discuss issue with me before reaching out to his dermatology (who he follows).  - No other associated issues.    Swollen pinky:  - Patient got an injury while doing yard work, where a tree thorn injured him.  - Happened a few weeks ago, concerned it is still swollen.  - Has not tried medical management, has not tried activity modification.    HTN:  - BP elevated today at 136/92 w/ repeat 134/99.  - Takes meds adherently.        Patient Active Problem List    Diagnosis Date Noted    Mixed hyperlipidemia 04/18/2024    H. 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 No pertinent past medical history.   History reviewed. No pertinent surgical history.   No family history on file.   Social History     Tobacco Use    Smoking status: Never    Smokeless tobacco: Never  "  Substance Use Topics    Alcohol use: Not on file      Review of Systems   Constitutional:        As per HPI, otherwise negative        Objective:     Vitals:    05/23/24 1007 05/23/24 1030   BP: (!) 136/92 (!) 134/99   BP Location: Left arm    Patient Position: Sitting    BP Method: Large (Automatic)    Pulse: 95    Resp: 18    Temp: 98.3 °F (36.8 °C)    TempSrc: Oral    SpO2: 99%    Weight: 88.8 kg (195 lb 12.3 oz)    Height: 5' 6" (1.676 m)      Estimated body mass index is 31.6 kg/m² as calculated from the following:    Height as of this encounter: 5' 6" (1.676 m).    Weight as of this encounter: 88.8 kg (195 lb 12.3 oz).     Physical Exam  Constitutional:       Appearance: Normal appearance. He is normal weight.   HENT:      Head: Normocephalic and atraumatic.      Mouth/Throat:      Mouth: Mucous membranes are moist.      Pharynx: Oropharynx is clear.   Eyes:      Extraocular Movements: Extraocular movements intact.      Conjunctiva/sclera: Conjunctivae normal.      Pupils: Pupils are equal, round, and reactive to light.   Cardiovascular:      Rate and Rhythm: Normal rate and regular rhythm.      Pulses: Normal pulses.      Heart sounds: Normal heart sounds.   Pulmonary:      Effort: Pulmonary effort is normal.      Breath sounds: Normal breath sounds.   Abdominal:      General: Abdomen is flat. Bowel sounds are normal.      Palpations: Abdomen is soft.      Tenderness: There is no abdominal tenderness.   Genitourinary:     Comments: Penis appeared to be healing well with no signs of active bleeding or infection when the foreskin is retracted. Mild erythema and skin thinning noted.  Musculoskeletal:         General: Swelling present.      Cervical back: Normal range of motion.      Right lower leg: No edema.      Left lower leg: No edema.      Comments: The patient's R little finger was slightly inflamed but showed no signs of infection. Full ROM   Skin:     General: Skin is warm and dry.      Capillary " Refill: Capillary refill takes less than 2 seconds.   Neurological:      Mental Status: He is alert and oriented to person, place, and time. Mental status is at baseline.   Psychiatric:         Mood and Affect: Mood normal.         Assessment/Plan:       Penis injury, initial encounter:  Assessment: The patient has reported experiencing bleeding during sexual intercourse following treatment for a bullous drug reaction. Skin conditions have largely resolved, but two out of three sexual encounters resulted in bleeding. The patient's physical examination showed healing without signs of active bleeding or infection.  Plan:  The patient is advised to slowly resume sexual activities and to stop if bleeding occurs.  The patient should monitor skin sensitivity due to the use of steroid creams and expect improvement over time.  The patient will follow up next month before making changes to antihypertensive medications.    Primary hypertension:  Assessment: The patient's blood pressure was slightly elevated during the visit. The patient reported adherence to medication for high blood pressure.  Plan:  The patient is advised to continue taking his medication for high blood pressure.  A follow-up visit is scheduled to monitor blood pressure.    Injury of finger of right hand, initial encounter:  Assessment: The patient reported a finger injury caused by a tree thorn while gardening. The patient's finger was slightly inflamed but showed no signs of infection.  Plan:  The patient is advised to continue applying ice and taking ibuprofen, if needed, to manage the inflammation.  The patient should avoid causing trauma to the area to allow it to heal.  If the condition does not improve, the patient is advised to return for a follow-up examination.    Gastroesophageal reflux disease, unspecified whether esophagitis present:  Encounter Orders: famotidine (PEPCID) 20 MG tablet; Take 1 tablet (20 mg total) by mouth 2 (two) times daily as  needed for Heartburn. Dispense: 60 tablet; Refill: 11.  Assessment: The patient continues to have mild GERD associated with food intake.  Plan:  The patient is prescribed Pepcid for GERD.  The patient should monitor and manage food intake to control GERD symptoms.    Mixed Hyperlipidemia:  Assessment: The patient has moderately elevated triglycerides and LDL on a recent lipid panel.  Plan:  The patient should continue statin therapy.  Lifestyle modifications, including diet and exercise, should be encouraged.    Class 1 obesity without serious comorbidity with body mass index (BMI) of 31.0 to 31.9 in adult, unspecified obesity type:  Assessment: The patient has a BMI of 31.60 kg/m², indicating Class 1 obesity.  Plan:  The patient should be advised on the importance of weight loss and maintaining a healthy lifestyle.  Regular follow-up to monitor progress and adjust the plan as necessary.    History of Bullous Eruption:  Assessment: The patient has a history of a bullous drug reaction that affected his penis, as well as other parts. The patient reported that skin conditions have largely resolved.  Plan:  The patient should continue regular follow-ups with Dermatology to monitor his skin condition.  The patient should avoid the suspected medications (Tetracycline or Metronidazole) in the future to prevent similar reactions.  Continue monitoring for any new or worsening skin symptoms.    History of Helicobacter Pylori Infection:  Assessment: The patient has a history of Helicobacter pylori infection. Negative stool testing post treatment.  Plan:  The patient should monitor for any recurrent symptoms of H. pylori infection.  Regular follow-up to ensure the infection has been completely eradicated.      Patient instructed to receive or provide proof of all deficient vaccines in chart - patient verbalized understanding      In regards to A&P, patient verbalized understanding and agreeable to plan      Follow-up:1 month BP  check      Case discussed with staff: Dr. Quinones      This note was partially created using [employer] Mobile Voice Recognition software. Typographical and content errors may occur with this process. While efforts are made to detect and correct such errors, in some cases errors will persist. For this reason, wording in this document should be considered in the proper context and not strictly verbatim.    The following information is provided to all patients.  This information is to help you find resources for any of the problems found today that may be affecting your health:                Living healthy guide: www.[facility].[place].[facility]       Understanding Diabetes: www.diabetes.org       Eating healthy: www.cdc.gov/healthyweight      [facility] home safety checklist: www.cdc.gov/steadi/patient.html      Agency on Aging: www.goea.[place].gov       Alcoholics anonymous (AA): www.aa.org      Physical Activity: www.rafael.nih.gov/oq3xuut       Tobacco use: www.quitwithusla.org        Demarco Haney MD  [facility], PGY-3  05/23/2024

## 2024-05-27 NOTE — PROGRESS NOTES
I assume primary medical responsibility for this patient. I have reviewed the history, physical, and assessement & treatment plan with the resident and agree that the care is reasonable and necessary. This service has been performed by a resident without the presence of a teaching physician under the primary care exception. If necessary, an addendum of additional findings or evaluation beyond the resident documentation will be noted below.    Agree with assessment to avoid inciting medication noting to be tetracycline and flagyl as potential causal agent of severe skin reaction. Skin has improved post cessation. Successful tx of H.pylori noted. Further bp surveillance warranted.

## 2024-12-19 ENCOUNTER — OFFICE VISIT (OUTPATIENT)
Dept: PRIMARY CARE CLINIC | Facility: CLINIC | Age: 44
End: 2024-12-19
Payer: COMMERCIAL

## 2024-12-19 VITALS
HEIGHT: 66 IN | OXYGEN SATURATION: 99 % | TEMPERATURE: 98 F | HEART RATE: 79 BPM | SYSTOLIC BLOOD PRESSURE: 142 MMHG | BODY MASS INDEX: 32.65 KG/M2 | DIASTOLIC BLOOD PRESSURE: 88 MMHG | WEIGHT: 203.13 LBS | RESPIRATION RATE: 18 BRPM

## 2024-12-19 DIAGNOSIS — Z71.85 VACCINE COUNSELING: ICD-10-CM

## 2024-12-19 DIAGNOSIS — I10 PRIMARY HYPERTENSION: Primary | ICD-10-CM

## 2024-12-19 DIAGNOSIS — R10.13 DYSPEPSIA: ICD-10-CM

## 2024-12-19 DIAGNOSIS — M25.561 ACUTE PAIN OF RIGHT KNEE: ICD-10-CM

## 2024-12-19 DIAGNOSIS — E78.2 MIXED HYPERLIPIDEMIA: ICD-10-CM

## 2024-12-19 PROCEDURE — 99999 PR PBB SHADOW E&M-EST. PATIENT-LVL IV: CPT | Mod: PBBFAC,,, | Performed by: FAMILY MEDICINE

## 2024-12-19 PROCEDURE — 99214 OFFICE O/P EST MOD 30 MIN: CPT | Mod: PBBFAC,PN | Performed by: FAMILY MEDICINE

## 2024-12-19 RX ORDER — ATORVASTATIN CALCIUM 20 MG/1
20 TABLET, FILM COATED ORAL DAILY
Qty: 30 TABLET | Refills: 11 | Status: SHIPPED | OUTPATIENT
Start: 2024-12-19 | End: 2025-12-19

## 2024-12-19 RX ORDER — AMLODIPINE AND VALSARTAN 10; 160 MG/1; MG/1
1 TABLET ORAL DAILY
Qty: 30 TABLET | Refills: 2 | Status: SHIPPED | OUTPATIENT
Start: 2024-12-19 | End: 2025-12-19

## 2024-12-19 NOTE — PROGRESS NOTES
"Subjective:       Patient ID: Farzaneh Bautista is a 44 y.o. male.    Chief Complaint: Establish Care (Pts old PCP no longer available needs new PCP) and R Knee swelling  (Pt c/o knee swelling x 2 weeks)    44 yr old  male with hx of htn, obesity, sever skin drug reaction, chronic lower abd pain her to establish care with new provider. Previously seen by resident at [facility] who has sense left the program out of state. He is concerned about right knee pain after playing with his child for prolonged periods on all fours. 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  Has home BP cuff and knows he needs to make bp med adjustments with bp consistently elevated SBP of 140s.   Has upcoming apt with GI at [facility] for chronic abd pains throughout the day unclear if worse with meals and accompanying diarrhea and constipation. Previously treated for h.pylori however pain persists.        Review of Systems    Objective:      Vitals:    12/19/24 0840   BP: (!) 142/88   BP Location: Left arm   Patient Position: Sitting   Pulse: 79   Resp: 18   Temp: 98.2 °F (36.8 °C)   TempSrc: Oral   SpO2: 99%   Weight: 92.1 kg (203 lb 2.5 oz)   Height: 5' 6" (1.676 m)     Physical Exam  Vitals and nursing note reviewed.   Constitutional:       General: He is not in acute distress.     Appearance: He is well-developed. He is obese. He is not ill-appearing.   HENT:      Head: Normocephalic and atraumatic.      Nose: Nose normal.   Eyes:      Conjunctiva/sclera: Conjunctivae normal.   Cardiovascular:      Rate and Rhythm: Normal rate and regular rhythm.      Heart sounds: Normal heart sounds.   Pulmonary:      Effort: Pulmonary effort is normal. No respiratory distress.   Abdominal:      General: There is no distension.      Palpations: Abdomen is soft.   Musculoskeletal:      Comments: Mild right knee swelling. No effusion or erythema. Full ROM and non TTP. "   Neurological:      Mental Status: He is alert.      Cranial Nerves: No cranial nerve deficit.   Psychiatric:         Mood and Affect: Mood normal.             Lab Results   Component Value Date     03/20/2024    K 4.2 03/20/2024     03/20/2024    CO2 26 03/20/2024    BUN 16 03/20/2024    CREATININE 1.2 03/20/2024    ANIONGAP 9 03/20/2024     Lab Results   Component Value Date    HGBA1C 5.2 02/28/2024     Lab Results   Component Value Date    BNP <10 03/20/2024       Lab Results   Component Value Date    WBC 8.34 03/20/2024    HGB 15.4 03/20/2024    HCT 43.9 03/20/2024     03/20/2024    GRAN 6.7 03/20/2024    GRAN 79.8 (H) 03/20/2024     Lab Results   Component Value Date    CHOL 261 (H) 02/28/2024    HDL 43 02/28/2024    LDLCALC 173.4 (H) 02/28/2024    TRIG 223 (H) 02/28/2024          Current Outpatient Medications:     amlodipine-valsartan (EXFORGE)  mg per tablet, Take 1 tablet by mouth once daily., Disp: 30 tablet, Rfl: 2    atorvastatin (LIPITOR) 20 MG tablet, Take 1 tablet (20 mg total) by mouth once daily., Disp: 30 tablet, Rfl: 11  No current facility-administered medications for this visit.        Assessment:       1. Primary hypertension    2. Acute pain of right knee    3. Dyspepsia    4. Mixed hyperlipidemia    5. Vaccine counseling           Plan:       Primary hypertension  -     amlodipine-valsartan (EXFORGE)  mg per tablet; Take 1 tablet by mouth once daily.  Dispense: 30 tablet; Refill: 2  Remains elevated on amlodipine 10mg with home measurements c/w SBP 140s/80s. Adding valsartan 160 mg with follow up in 2 weeks    Mixed hyperlipidemia  -     atorvastatin (LIPITOR) 20 MG tablet; Take 1 tablet (20 mg total) by mouth once daily.  Dispense: 30 tablet; Refill: 11    Vaccine counseling  -     influenza (Flulaval, Fluzone, Fluarix) 45 mcg/0.5 mL IM vaccine (> or = 6 mo) 0.5 mL  -     Tdap vaccine injection 0.5 mL  due  Dyspepsia  Ongoing bloating and mid lower abd pains  persistent s/p eradication of h.pylori. alternating diarrhea and constipation with workup ongoing with GI at [facility]. Reviewed food choices.      Knee pain:      Anterior knee pain c/w tendonitis after prolonged pressure on all fours at work and playing with child. To use knee pads consistently which he is not doing currently. NSAIDS spariingly. Normal exam today.

## 2025-03-26 DIAGNOSIS — I10 HYPERTENSION: ICD-10-CM

## 2025-04-22 ENCOUNTER — TELEPHONE (OUTPATIENT)
Dept: PRIMARY CARE CLINIC | Facility: CLINIC | Age: 45
End: 2025-04-22
Payer: COMMERCIAL

## 2025-04-22 DIAGNOSIS — I10 PRIMARY HYPERTENSION: ICD-10-CM

## 2025-04-22 RX ORDER — AMLODIPINE AND VALSARTAN 10; 160 MG/1; MG/1
1 TABLET ORAL DAILY
Qty: 30 TABLET | Refills: 0 | Status: SHIPPED | OUTPATIENT
Start: 2025-04-22 | End: 2026-04-22

## 2025-04-22 NOTE — TELEPHONE ENCOUNTER
----- Message from Med Assistant Flores sent at 4/22/2025  3:06 PM CDT -----    ----- Message -----  From: Hanh Plascencia  Sent: 4/22/2025   1:21 PM CDT  To: Stacie Alarcon Staff    Type:  RX Refill RequestWho Called: Farzaneh Leonard Refill or New Rx:Refill RX Name and Strength:amlodipine-valsartan (EXFORGE)  mg per tabletHow is the patient currently taking it? (ex. 1XDay):Route: Take 1 tablet by mouth once daily. - OralIs this a 30 day or 90 day RX: 30 day Preferred Pharmacy with phone number:[id number]  [place] - [address] AT [facility] [address]  [address] [address] Phone: [phone]: [phone] or Mail Order:Local Ordering Provider:RENITA Quinones Would the patient rather a call back or a response via [facility]? Call back Best Call Back Number: [phone] Information: Pt states that he would like to get his medication filled as soon as possible due to him being out. Pt states to please give him a call back with further assistance.

## 2025-04-22 NOTE — TELEPHONE ENCOUNTER
----- Message from Med Assistant Flores sent at 4/22/2025  3:06 PM CDT -----    ----- Message -----  From: Hanh Plascencia  Sent: 4/22/2025   1:21 PM CDT  To: Stacie Alarcon Staff    Type:  RX Refill RequestWho Called: Farzaneh Leonard Refill or New Rx:Refill RX Name and Strength:amlodipine-valsartan (EXFORGE)  mg per tabletHow is the patient currently taking it? (ex. 1XDay):Route: Take 1 tablet by mouth once daily. - OralIs this a 30 day or 90 day RX: 30 day Preferred Pharmacy with phone number:[facility] #[id number]  [place] - [address] AT [facility] [address]  [address] [address] Phone: [phone]: [phone] or Mail Order:Local Ordering Provider:RENITA Quinones Would the patient rather a call back or a response via [facility]? Call back Best Call Back Number: [phone] Information: Pt states that he would like to get his medication filled as soon as possible due to him being out. Pt states to please give him a call back with further assistance.

## 2025-04-29 ENCOUNTER — PATIENT OUTREACH (OUTPATIENT)
Dept: ADMINISTRATIVE | Facility: OTHER | Age: 45
End: 2025-04-29
Payer: COMMERCIAL

## 2025-04-29 ENCOUNTER — OFFICE VISIT (OUTPATIENT)
Dept: PRIMARY CARE CLINIC | Facility: CLINIC | Age: 45
End: 2025-04-29
Payer: COMMERCIAL

## 2025-04-29 VITALS
OXYGEN SATURATION: 99 % | DIASTOLIC BLOOD PRESSURE: 84 MMHG | SYSTOLIC BLOOD PRESSURE: 131 MMHG | HEART RATE: 79 BPM | BODY MASS INDEX: 32.01 KG/M2 | WEIGHT: 198.31 LBS | TEMPERATURE: 99 F

## 2025-04-29 DIAGNOSIS — I10 PRIMARY HYPERTENSION: Primary | ICD-10-CM

## 2025-04-29 PROCEDURE — 99999 PR PBB SHADOW E&M-EST. PATIENT-LVL III: CPT | Mod: PBBFAC,,, | Performed by: FAMILY MEDICINE

## 2025-04-29 PROCEDURE — 99213 OFFICE O/P EST LOW 20 MIN: CPT | Mod: S$GLB,,, | Performed by: FAMILY MEDICINE

## 2025-04-29 RX ORDER — AMLODIPINE AND OLMESARTAN MEDOXOMIL 10; 20 MG/1; MG/1
1 TABLET ORAL DAILY
Qty: 90 TABLET | Refills: 0 | Status: SHIPPED | OUTPATIENT
Start: 2025-04-29 | End: 2026-04-29

## 2025-04-29 NOTE — PROGRESS NOTES
CHW - Initial Contact    This Community Health Worker completed  the Social Determinant of Health questionnaire with patient during clinic visit today.    Pt identified barriers of most importance are: NONE   Referrals to community agencies completed with patient/caregiver consent outside of [facility] include: NONE  Referrals were put through [facility] - : NO  Support and Services: No support & services have been documented.  Other information discussed the patient needs / wants help with: NONE   Follow up required: NO  No future outreach task assigned

## 2025-04-29 NOTE — PROGRESS NOTES
Subjective:       Patient ID: Farzaneh Bautista is a 44 y.o. male.    Chief Complaint: Follow-up    HPI  History of Present Illness    CHIEF COMPLAINT:  Patient presents today for follow up of blood pressure management    BLOOD PRESSURE:  He reports elevated blood pressure of Sbp of 152 during a dental visit last week while not taking his blood pressure medication. He resumed taking his medication 4 days ago, and current home SBP measurements are in low 130s mostly. Did not bring in written log. No unwanted side effects. He recently got new insurance and was not able to rtc sooner bc his insurance was not accepted thus running out of his amlodipine-valsartan medication.     GI ISSUES:  He was seen by gastroenterology two weeks ago, where follow-up testing showed improvement in fatty liver disease. He continues to experience persistent abdominal pain for over one year without a definitive diagnosis, with further diagnostic testing planned.    HEMORRHOIDS:  He reports rectal pain with daily, non-hard bowel movements for which he was prescribed preparation H by another physician.          Review of Systems    Objective:      Vitals:    04/29/25 0821   BP: 131/84   BP Location: Right arm   Patient Position: Sitting   Pulse: 79   Temp: 98.6 °F (37 °C)   TempSrc: Oral   SpO2: 99%   Weight: 89.9 kg (198 lb 4.9 oz)     Physical Exam  Vitals and nursing note reviewed.   Constitutional:       Appearance: He is well-developed.   HENT:      Head: Normocephalic and atraumatic.      Nose: Nose normal.   Eyes:      Conjunctiva/sclera: Conjunctivae normal.   Pulmonary:      Effort: Pulmonary effort is normal. No respiratory distress.   Abdominal:      General: There is no distension.      Palpations: Abdomen is soft.   Neurological:      Mental Status: He is alert.      Cranial Nerves: No cranial nerve deficit.   Psychiatric:         Mood and Affect: Mood normal.         Physical Exam               Lab Results   Component  Value Date     03/20/2024    K 4.2 03/20/2024     03/20/2024    CO2 26 03/20/2024    BUN 16 03/20/2024    CREATININE 1.2 03/20/2024    ANIONGAP 9 03/20/2024     Lab Results   Component Value Date    HGBA1C 5.2 02/28/2024     Lab Results   Component Value Date    BNP <10 03/20/2024       Lab Results   Component Value Date    WBC 8.34 03/20/2024    HGB 15.4 03/20/2024    HCT 43.9 03/20/2024     03/20/2024    GRAN 6.7 03/20/2024    GRAN 79.8 (H) 03/20/2024     Lab Results   Component Value Date    CHOL 261 (H) 02/28/2024    HDL 43 02/28/2024    LDLCALC 173.4 (H) 02/28/2024    TRIG 223 (H) 02/28/2024        Current Medications[1]        Assessment:       1. Primary hypertension           Plan:       Primary hypertension  -     amlodipine-olmesartan (JOSE ARMANDO) 10-20 mg per tablet; Take 1 tablet by mouth once daily.  Dispense: 90 tablet; Refill: 0     BP remains mildly elevated today SBP of 130s/90 on repeat. Dc amlodipine valsartan and starting amlodipine/olmesartan. To continue to keep bp log and let writer know what bps are with new medication via [facility].           This note was generated with the assistance of ambient listening technology. Verbal consent was obtained by the patient and accompanying visitor(s) for the recording of patient appointment to facilitate this note. I attest to having reviewed and edited the generated note for accuracy, though some syntax or spelling errors may persist. Please contact the author of this note for any clarification.            [1]   Current Outpatient Medications:     amlodipine-olmesartan (JOSE ARMANDO) 10-20 mg per tablet, Take 1 tablet by mouth once daily., Disp: 90 tablet, Rfl: 0    atorvastatin (LIPITOR) 20 MG tablet, Take 1 tablet (20 mg total) by mouth once daily. (Patient not taking: Reported on 4/29/2025), Disp: 30 tablet, Rfl: 11

## 2025-08-15 DIAGNOSIS — I10 PRIMARY HYPERTENSION: ICD-10-CM

## 2025-08-15 RX ORDER — AMLODIPINE AND OLMESARTAN MEDOXOMIL 10; 20 MG/1; MG/1
1 TABLET ORAL DAILY
Qty: 90 TABLET | Refills: 0 | OUTPATIENT
Start: 2025-08-15 | End: 2026-08-15

## 2025-08-19 DIAGNOSIS — I10 PRIMARY HYPERTENSION: ICD-10-CM

## 2025-08-19 RX ORDER — AMLODIPINE AND OLMESARTAN MEDOXOMIL 10; 20 MG/1; MG/1
1 TABLET ORAL DAILY
Qty: 30 TABLET | Refills: 0 | Status: SHIPPED | OUTPATIENT
Start: 2025-08-19 | End: 2025-08-20 | Stop reason: SDUPTHER

## 2025-08-20 ENCOUNTER — OFFICE VISIT (OUTPATIENT)
Dept: PRIMARY CARE CLINIC | Facility: CLINIC | Age: 45
End: 2025-08-20
Payer: COMMERCIAL

## 2025-08-20 ENCOUNTER — LAB VISIT (OUTPATIENT)
Dept: PRIMARY CARE CLINIC | Facility: CLINIC | Age: 45
End: 2025-08-20
Payer: COMMERCIAL

## 2025-08-20 VITALS
TEMPERATURE: 98 F | DIASTOLIC BLOOD PRESSURE: 96 MMHG | WEIGHT: 196 LBS | BODY MASS INDEX: 31.5 KG/M2 | HEART RATE: 87 BPM | HEIGHT: 66 IN | OXYGEN SATURATION: 99 % | SYSTOLIC BLOOD PRESSURE: 159 MMHG

## 2025-08-20 DIAGNOSIS — I10 PRIMARY HYPERTENSION: Primary | ICD-10-CM

## 2025-08-20 DIAGNOSIS — I10 PRIMARY HYPERTENSION: ICD-10-CM

## 2025-08-20 DIAGNOSIS — E78.2 MIXED HYPERLIPIDEMIA: ICD-10-CM

## 2025-08-20 DIAGNOSIS — I10 HYPERTENSION: ICD-10-CM

## 2025-08-20 PROBLEM — A04.8 H. PYLORI INFECTION: Status: RESOLVED | Noted: 2024-04-02 | Resolved: 2025-08-20

## 2025-08-20 PROBLEM — L13.9 BULLOUS ERUPTION: Status: RESOLVED | Noted: 2024-03-20 | Resolved: 2025-08-20

## 2025-08-20 PROBLEM — L27.1 FIXED DRUG ERUPTION: Status: RESOLVED | Noted: 2024-03-20 | Resolved: 2025-08-20

## 2025-08-20 LAB
ALBUMIN SERPL BCP-MCNC: 4.7 G/DL (ref 3.5–5.2)
ALP SERPL-CCNC: 75 UNIT/L (ref 40–150)
ALT SERPL W/O P-5'-P-CCNC: 73 UNIT/L (ref 0–55)
ANION GAP (OHS): 11 MMOL/L (ref 8–16)
AST SERPL-CCNC: 39 UNIT/L (ref 0–50)
BILIRUB SERPL-MCNC: 0.4 MG/DL (ref 0.1–1)
BUN SERPL-MCNC: 14 MG/DL (ref 6–20)
CALCIUM SERPL-MCNC: 10 MG/DL (ref 8.7–10.5)
CHLORIDE SERPL-SCNC: 107 MMOL/L (ref 95–110)
CO2 SERPL-SCNC: 23 MMOL/L (ref 23–29)
CREAT SERPL-MCNC: 0.9 MG/DL (ref 0.5–1.4)
GFR SERPLBLD CREATININE-BSD FMLA CKD-EPI: >60 ML/MIN/1.73/M2
GLUCOSE SERPL-MCNC: 99 MG/DL (ref 70–110)
POTASSIUM SERPL-SCNC: 3.6 MMOL/L (ref 3.5–5.1)
PROT SERPL-MCNC: 7.8 GM/DL (ref 6–8.4)
SODIUM SERPL-SCNC: 141 MMOL/L (ref 136–145)

## 2025-08-20 PROCEDURE — 99213 OFFICE O/P EST LOW 20 MIN: CPT | Mod: S$GLB,,, | Performed by: FAMILY MEDICINE

## 2025-08-20 PROCEDURE — 99999 PR PBB SHADOW E&M-EST. PATIENT-LVL III: CPT | Mod: PBBFAC,,, | Performed by: FAMILY MEDICINE

## 2025-08-20 PROCEDURE — G2211 COMPLEX E/M VISIT ADD ON: HCPCS | Mod: S$GLB,,, | Performed by: FAMILY MEDICINE

## 2025-08-20 PROCEDURE — 84155 ASSAY OF PROTEIN SERUM: CPT

## 2025-08-20 RX ORDER — ATORVASTATIN CALCIUM 20 MG/1
20 TABLET, FILM COATED ORAL DAILY
Qty: 30 TABLET | Refills: 11 | Status: SHIPPED | OUTPATIENT
Start: 2025-08-20 | End: 2026-08-20

## 2025-08-20 RX ORDER — AMLODIPINE AND OLMESARTAN MEDOXOMIL 10; 20 MG/1; MG/1
1 TABLET ORAL DAILY
Qty: 60 TABLET | Refills: 0 | Status: SHIPPED | OUTPATIENT
Start: 2025-08-20 | End: 2025-08-20

## 2025-08-20 RX ORDER — OLMESARTAN MEDOXOMIL 20 MG/1
20 TABLET ORAL DAILY
Qty: 30 TABLET | Refills: 2 | Status: SHIPPED | OUTPATIENT
Start: 2025-08-20 | End: 2026-08-20

## 2025-08-20 RX ORDER — AMLODIPINE BESYLATE 10 MG/1
10 TABLET ORAL DAILY
Qty: 30 TABLET | Refills: 2 | Status: SHIPPED | OUTPATIENT
Start: 2025-08-20 | End: 2026-08-20